# Patient Record
Sex: FEMALE | Race: WHITE | NOT HISPANIC OR LATINO | URBAN - METROPOLITAN AREA
[De-identification: names, ages, dates, MRNs, and addresses within clinical notes are randomized per-mention and may not be internally consistent; named-entity substitution may affect disease eponyms.]

---

## 2023-08-17 PROBLEM — M54.50 LOWER BACK PAIN: Status: ACTIVE | Noted: 2023-08-17

## 2023-08-17 PROBLEM — S93.401A SPRAIN OF RIGHT ANKLE: Status: ACTIVE | Noted: 2023-08-17

## 2023-08-17 PROBLEM — M19.90 ARTHRITIS: Status: ACTIVE | Noted: 2023-08-17

## 2023-08-17 PROBLEM — S82.899A ANKLE FRACTURE: Status: ACTIVE | Noted: 2023-08-17

## 2023-08-17 PROBLEM — M06.9 RHEUMATOID ARTHRITIS (MULTI): Status: ACTIVE | Noted: 2023-08-17

## 2023-08-17 PROBLEM — M51.37 DEGENERATION OF INTERVERTEBRAL DISC OF LUMBOSACRAL REGION: Status: ACTIVE | Noted: 2023-08-17

## 2023-08-17 PROBLEM — I10 ESSENTIAL HYPERTENSION: Status: ACTIVE | Noted: 2023-08-17

## 2023-08-17 PROBLEM — R00.0 TACHYCARDIA: Status: ACTIVE | Noted: 2023-08-17

## 2023-08-17 PROBLEM — G57.13 MERALGIA PARESTHETICA OF BOTH LOWER EXTREMITIES: Status: ACTIVE | Noted: 2023-08-17

## 2023-08-17 PROBLEM — M25.571 PAIN IN RIGHT ANKLE AND JOINTS OF RIGHT FOOT: Status: ACTIVE | Noted: 2023-08-17

## 2023-08-17 PROBLEM — M54.42 BILATERAL LOW BACK PAIN WITH LEFT-SIDED SCIATICA: Status: ACTIVE | Noted: 2023-08-17

## 2023-08-17 PROBLEM — G89.29 OTHER CHRONIC PAIN: Status: ACTIVE | Noted: 2023-08-17

## 2023-08-17 PROBLEM — F41.9 ANXIETY: Status: ACTIVE | Noted: 2023-08-17

## 2023-08-17 PROBLEM — E11.40 PAINFUL DIABETIC NEUROPATHY (MULTI): Status: ACTIVE | Noted: 2023-08-17

## 2023-08-17 PROBLEM — G47.9 SLEEP DISORDER: Status: ACTIVE | Noted: 2023-08-17

## 2023-08-17 PROBLEM — M51.379 DEGENERATION OF INTERVERTEBRAL DISC OF LUMBOSACRAL REGION: Status: ACTIVE | Noted: 2023-08-17

## 2023-08-17 PROBLEM — M25.371 RIGHT ANKLE INSTABILITY: Status: ACTIVE | Noted: 2023-08-17

## 2023-08-17 PROBLEM — D05.02 LOBULAR CARCINOMA IN SITU (LCIS) OF LEFT BREAST: Status: ACTIVE | Noted: 2023-08-17

## 2023-08-17 PROBLEM — M81.0 OSTEOPOROSIS: Status: ACTIVE | Noted: 2023-08-17

## 2023-08-17 PROBLEM — M54.16 LEFT LUMBAR RADICULITIS: Status: ACTIVE | Noted: 2023-08-17

## 2023-08-17 PROBLEM — Z85.3 HISTORY OF LEFT BREAST CANCER: Status: ACTIVE | Noted: 2023-08-17

## 2023-08-17 PROBLEM — J30.2 SEASONAL ALLERGIES: Status: ACTIVE | Noted: 2023-08-17

## 2023-08-17 PROBLEM — G47.00 INSOMNIA: Status: ACTIVE | Noted: 2023-08-17

## 2023-08-17 PROBLEM — R60.0 EDEMA OF BOTH LEGS: Status: ACTIVE | Noted: 2023-08-17

## 2023-08-17 PROBLEM — K59.01 CONSTIPATION BY DELAYED COLONIC TRANSIT: Status: ACTIVE | Noted: 2023-08-17

## 2023-08-17 PROBLEM — M25.559 PAIN IN JOINT INVOLVING PELVIC REGION AND THIGH: Status: ACTIVE | Noted: 2023-08-17

## 2023-08-17 PROBLEM — M81.0 POST-MENOPAUSAL OSTEOPOROSIS: Status: ACTIVE | Noted: 2023-08-17

## 2023-08-17 PROBLEM — C50.912 MALIGNANT NEOPLASM OF LEFT FEMALE BREAST (MULTI): Status: ACTIVE | Noted: 2023-08-17

## 2023-08-17 PROBLEM — E55.9 VITAMIN D DEFICIENCY: Status: ACTIVE | Noted: 2023-08-17

## 2023-08-17 PROBLEM — R42 DIZZINESS AND GIDDINESS: Status: ACTIVE | Noted: 2023-08-17

## 2023-08-17 PROBLEM — K21.9 GERD WITHOUT ESOPHAGITIS: Status: ACTIVE | Noted: 2023-08-17

## 2023-08-17 RX ORDER — CLONAZEPAM 1 MG/1
1 TABLET ORAL DAILY
COMMUNITY
Start: 2023-04-26 | End: 2023-10-26

## 2023-08-17 RX ORDER — ANASTROZOLE 1 MG/1
1 TABLET ORAL
COMMUNITY
End: 2023-11-21 | Stop reason: ALTCHOICE

## 2023-08-17 RX ORDER — TRIAMTERENE/HYDROCHLOROTHIAZID 37.5-25 MG
1 TABLET ORAL DAILY
COMMUNITY
End: 2023-12-27

## 2023-08-17 RX ORDER — TRAMADOL HYDROCHLORIDE AND ACETAMINOPHEN 37.5; 325 MG/1; MG/1
1 TABLET, FILM COATED ORAL EVERY 4 HOURS PRN
COMMUNITY
Start: 2016-12-02 | End: 2023-11-21 | Stop reason: ALTCHOICE

## 2023-08-17 RX ORDER — OMEPRAZOLE 20 MG/1
20 CAPSULE, DELAYED RELEASE ORAL DAILY
COMMUNITY
End: 2023-12-28

## 2023-08-17 RX ORDER — DOCUSATE SODIUM 100 MG/1
100 CAPSULE, LIQUID FILLED ORAL 2 TIMES DAILY
COMMUNITY

## 2023-08-17 RX ORDER — DULOXETIN HYDROCHLORIDE 60 MG/1
60 CAPSULE, DELAYED RELEASE ORAL DAILY
COMMUNITY
Start: 2022-05-23 | End: 2024-01-08 | Stop reason: SDUPTHER

## 2023-08-17 RX ORDER — SULFASALAZINE 500 MG/1
500 TABLET ORAL EVERY 12 HOURS
COMMUNITY
End: 2024-01-18 | Stop reason: SDUPTHER

## 2023-08-17 RX ORDER — LACTULOSE 10 G/15ML
SOLUTION ORAL
COMMUNITY

## 2023-08-17 RX ORDER — TIZANIDINE 4 MG/1
4 TABLET ORAL
COMMUNITY
Start: 2017-11-10

## 2023-08-17 RX ORDER — TOCILIZUMAB 180 MG/ML
162 INJECTION, SOLUTION SUBCUTANEOUS
COMMUNITY
End: 2023-11-21 | Stop reason: ALTCHOICE

## 2023-08-18 RX ORDER — CELECOXIB 200 MG/1
200 CAPSULE ORAL
COMMUNITY
Start: 2023-07-25 | End: 2024-01-08 | Stop reason: SDUPTHER

## 2023-08-18 RX ORDER — TOCILIZUMAB 180 MG/ML
162 INJECTION, SOLUTION SUBCUTANEOUS
COMMUNITY
Start: 2023-05-14 | End: 2023-11-21 | Stop reason: ALTCHOICE

## 2023-08-18 RX ORDER — UPADACITINIB 15 MG/1
15 TABLET, EXTENDED RELEASE ORAL DAILY
COMMUNITY
Start: 2023-02-08 | End: 2023-11-21 | Stop reason: ALTCHOICE

## 2023-08-18 RX ORDER — BETAMETHASONE DIPROPIONATE 0.5 MG/G
OINTMENT TOPICAL DAILY
COMMUNITY
Start: 2023-01-27

## 2023-08-18 RX ORDER — ABATACEPT 125 MG/ML
125 INJECTION, SOLUTION SUBCUTANEOUS
COMMUNITY
Start: 2022-11-22 | End: 2023-11-21 | Stop reason: ALTCHOICE

## 2023-08-18 RX ORDER — METFORMIN HYDROCHLORIDE 750 MG/1
750 TABLET, EXTENDED RELEASE ORAL
COMMUNITY
Start: 2023-07-06 | End: 2023-10-02 | Stop reason: ALTCHOICE

## 2023-08-18 RX ORDER — TRAZODONE HYDROCHLORIDE 50 MG/1
50 TABLET ORAL NIGHTLY
COMMUNITY
Start: 2023-07-25 | End: 2023-10-02 | Stop reason: SDUPTHER

## 2023-08-18 RX ORDER — BARICITINIB 2 MG/1
2 TABLET, FILM COATED ORAL DAILY
COMMUNITY
Start: 2023-07-13 | End: 2023-11-21 | Stop reason: ALTCHOICE

## 2023-08-18 RX ORDER — TOPIRAMATE 25 MG/1
25 TABLET ORAL
COMMUNITY
Start: 2023-07-25 | End: 2023-11-21 | Stop reason: ALTCHOICE

## 2023-08-18 RX ORDER — AMOXICILLIN 500 MG/1
500 CAPSULE ORAL
COMMUNITY
Start: 2023-03-06 | End: 2023-11-21 | Stop reason: ALTCHOICE

## 2023-09-07 ENCOUNTER — HOSPITAL ENCOUNTER (OUTPATIENT)
Dept: DATA CONVERSION | Facility: HOSPITAL | Age: 63
Discharge: HOME | End: 2023-09-07
Payer: COMMERCIAL

## 2023-09-07 DIAGNOSIS — D47.2 MONOCLONAL GAMMOPATHY: ICD-10-CM

## 2023-09-07 DIAGNOSIS — K75.9 INFLAMMATORY LIVER DISEASE, UNSPECIFIED: ICD-10-CM

## 2023-09-07 DIAGNOSIS — D84.1 DEFECTS IN THE COMPLEMENT SYSTEM (MULTI): ICD-10-CM

## 2023-09-07 DIAGNOSIS — M06.4 INFLAMMATORY POLYARTHROPATHY (MULTI): ICD-10-CM

## 2023-09-07 DIAGNOSIS — M35.9 SYSTEMIC INVOLVEMENT OF CONNECTIVE TISSUE, UNSPECIFIED (MULTI): ICD-10-CM

## 2023-09-07 DIAGNOSIS — M45.9 ANKYLOSING SPONDYLITIS OF UNSPECIFIED SITES IN SPINE (MULTI): ICD-10-CM

## 2023-09-07 LAB
BACTERIA UR QL AUTO: POSITIVE
BASOPHILS # BLD AUTO: 0.03 K/UL (ref 0–0.22)
BASOPHILS NFR BLD AUTO: 0.6 % (ref 0–1)
BILIRUB UR QL STRIP.AUTO: NEGATIVE
C3 SERPL-MCNC: 129 MG/DL (ref 68–260)
C4 SERPL-MCNC: 19 MG/DL (ref 16–64)
CLARITY UR: CLEAR
COLOR UR: YELLOW
CRP SERPL-MCNC: 0.5 MG/DL (ref 0–2)
DEPRECATED RDW RBC AUTO: 49.5 FL (ref 37–54)
DIFFERENTIAL METHOD BLD: ABNORMAL
EOSINOPHIL # BLD AUTO: 0.09 K/UL (ref 0–0.45)
EOSINOPHIL NFR BLD: 1.8 % (ref 0–3)
ERYTHROCYTE [DISTWIDTH] IN BLOOD BY AUTOMATED COUNT: 13.2 % (ref 11.7–15)
ERYTHROCYTE [SEDIMENTATION RATE] IN BLOOD BY WESTERGREN METHOD: 10 MM/HR (ref 0–30)
GLUCOSE UR STRIP.AUTO-MCNC: NEGATIVE MG/DL
HCT VFR BLD AUTO: 36.6 % (ref 36–44)
HGB BLD-MCNC: 11.8 GM/DL (ref 12–15)
HGB UR QL STRIP.AUTO: 1 /HPF (ref 0–3)
HGB UR QL: NEGATIVE
HYALINE CASTS UR QL AUTO: ABNORMAL /LPF
IMM GRANULOCYTES # BLD AUTO: 0.04 K/UL (ref 0–0.1)
KETONES UR QL STRIP.AUTO: NEGATIVE
LEUKOCYTE ESTERASE UR QL STRIP.AUTO: NEGATIVE
LYMPHOCYTES # BLD AUTO: 1.51 K/UL (ref 1.2–3.2)
LYMPHOCYTES NFR BLD MANUAL: 30.4 % (ref 20–40)
MCH RBC QN AUTO: 32.7 PG (ref 26–34)
MCHC RBC AUTO-ENTMCNC: 32.2 % (ref 31–37)
MCV RBC AUTO: 101.4 FL (ref 80–100)
MICROSCOPIC (UA): ABNORMAL
MONOCYTES # BLD AUTO: 0.36 K/UL (ref 0–0.8)
MONOCYTES NFR BLD MANUAL: 7.3 % (ref 0–8)
NEUTROPHILS # BLD AUTO: 2.93 K/UL
NEUTROPHILS # BLD AUTO: 2.93 K/UL (ref 1.8–7.7)
NEUTROPHILS.IMMATURE NFR BLD: 0.8 % (ref 0–1)
NEUTS SEG NFR BLD: 59.1 % (ref 50–70)
NITRITE UR QL STRIP.AUTO: NEGATIVE
NRBC BLD-RTO: 0 /100 WBC
PH UR STRIP.AUTO: 6 [PH] (ref 4.6–8)
PLATELET # BLD AUTO: 426 K/UL (ref 150–450)
PMV BLD AUTO: 9.8 CU (ref 7–12.6)
PROT UR STRIP.AUTO-MCNC: NEGATIVE MG/DL
RBC # BLD AUTO: 3.61 M/UL (ref 4–4.9)
RHEUMATOID FACT SERPL-ACNC: 10 IU/ML (ref 0–20)
SP GR UR STRIP.AUTO: 1.01 (ref 1–1.03)
SQUAMOUS UR QL AUTO: ABNORMAL /HPF
URINE CULTURE: ABNORMAL
UROBILINOGEN UR QL STRIP.AUTO: NORMAL MG/DL (ref 0–1)
WBC # BLD AUTO: 5 K/UL (ref 4.5–11)
WBC #/AREA URNS AUTO: 37 /HPF (ref 0–3)

## 2023-09-08 LAB
ALBUMIN SERPL ELPH-MCNC: NORMAL G/DL
ALPHA1 GLOB SERPL ELPH-MCNC: NORMAL G/DL
ALPHA2 GLOB SERPL ELPH-MCNC: NORMAL G/DL
ANA PATTERN: ABNORMAL
ANA SER QL IA: ABNORMAL
ANA TITER: ABNORMAL
B-GLOBULIN SERPL ELPH-MCNC: NORMAL G/DL
CCP IGG SERPL-ACNC: NORMAL
ENA RNP AB SER-ACNC: NORMAL
ENA SS-A AB SER-ACNC: NORMAL
ENA SS-B AB SER-ACNC: NORMAL
GAMMA GLOB SERPL ELPH-MCNC: NORMAL G/DL
HAV IGM SER QL: NORMAL
HBV CORE IGM SER QL: NORMAL
HBV SURFACE AG SER QL: NORMAL
HCV AB SER QL: NORMAL
PROT PATTERN SERPL ELPH-IMP: NORMAL
PROT SERPL-MCNC: NORMAL G/DL

## 2023-09-11 LAB
IL6 SERPL-MCNC: ABNORMAL PG/ML
SPE STAFF REVIEW: NORMAL

## 2023-09-12 LAB — HLA-B27 DNA RESULT: NORMAL

## 2023-09-14 LAB
CEP-1 AB SER IA-ACNC: NORMAL
MUT CIT VIMENTIN AB SER-ACNC: ABNORMAL

## 2023-09-18 LAB — C2 SERPL-MCNC: NORMAL MG/L

## 2023-09-19 LAB — C1Q SERPL-MCNC: NORMAL UG/ML

## 2023-09-25 PROBLEM — D47.2 ASYMPTOMATIC MONOCLONAL GAMMOPATHY: Status: ACTIVE | Noted: 2023-09-25

## 2023-09-25 PROBLEM — D84.1 DEFECTS IN THE COMPLEMENT SYSTEM (MULTI): Status: ACTIVE | Noted: 2023-09-25

## 2023-09-25 PROBLEM — M06.4 INFLAMMATORY POLYARTHRITIS (MULTI): Status: ACTIVE | Noted: 2023-09-25

## 2023-09-25 PROBLEM — R00.2 PALPITATIONS: Status: ACTIVE | Noted: 2023-09-25

## 2023-09-25 PROBLEM — M45.9 ANKYLOSING SPONDYLITIS OF UNSPECIFIED SITES IN SPINE (MULTI): Status: ACTIVE | Noted: 2023-09-25

## 2023-09-25 PROBLEM — E88.819 INSULIN RESISTANCE: Status: ACTIVE | Noted: 2023-09-25

## 2023-09-25 PROBLEM — M35.9 SYSTEMIC INVOLVEMENT OF CONNECTIVE TISSUE, UNSPECIFIED (MULTI): Status: ACTIVE | Noted: 2023-09-25

## 2023-09-25 PROBLEM — M79.7 FIBROMYALGIA: Status: ACTIVE | Noted: 2023-09-25

## 2023-10-02 ENCOUNTER — OFFICE VISIT (OUTPATIENT)
Dept: PRIMARY CARE | Facility: CLINIC | Age: 63
End: 2023-10-02
Payer: COMMERCIAL

## 2023-10-02 ENCOUNTER — OFFICE VISIT (OUTPATIENT)
Dept: PAIN MEDICINE | Facility: CLINIC | Age: 63
End: 2023-10-02
Payer: COMMERCIAL

## 2023-10-02 VITALS
HEIGHT: 65 IN | DIASTOLIC BLOOD PRESSURE: 68 MMHG | BODY MASS INDEX: 30.12 KG/M2 | HEART RATE: 110 BPM | TEMPERATURE: 97.1 F | WEIGHT: 180.8 LBS | SYSTOLIC BLOOD PRESSURE: 124 MMHG

## 2023-10-02 VITALS
DIASTOLIC BLOOD PRESSURE: 76 MMHG | RESPIRATION RATE: 22 BRPM | BODY MASS INDEX: 26.66 KG/M2 | SYSTOLIC BLOOD PRESSURE: 128 MMHG | HEIGHT: 65 IN | HEART RATE: 83 BPM | WEIGHT: 160 LBS

## 2023-10-02 DIAGNOSIS — I10 ESSENTIAL HYPERTENSION: Primary | ICD-10-CM

## 2023-10-02 DIAGNOSIS — E78.5 DYSLIPIDEMIA: ICD-10-CM

## 2023-10-02 DIAGNOSIS — K59.01 CONSTIPATION BY DELAYED COLONIC TRANSIT: ICD-10-CM

## 2023-10-02 DIAGNOSIS — M79.7 FIBROMYALGIA: ICD-10-CM

## 2023-10-02 DIAGNOSIS — E88.819 INSULIN RESISTANCE: ICD-10-CM

## 2023-10-02 DIAGNOSIS — F41.9 ANXIETY: ICD-10-CM

## 2023-10-02 DIAGNOSIS — M54.16 LUMBAR RADICULOPATHY: Primary | ICD-10-CM

## 2023-10-02 DIAGNOSIS — F51.01 PRIMARY INSOMNIA: ICD-10-CM

## 2023-10-02 DIAGNOSIS — M54.16 LUMBAR RADICULOPATHY: ICD-10-CM

## 2023-10-02 PROCEDURE — 99214 OFFICE O/P EST MOD 30 MIN: CPT | Performed by: ANESTHESIOLOGY

## 2023-10-02 PROCEDURE — 3074F SYST BP LT 130 MM HG: CPT | Performed by: ANESTHESIOLOGY

## 2023-10-02 PROCEDURE — 99214 OFFICE O/P EST MOD 30 MIN: CPT | Performed by: INTERNAL MEDICINE

## 2023-10-02 PROCEDURE — 3074F SYST BP LT 130 MM HG: CPT | Performed by: INTERNAL MEDICINE

## 2023-10-02 PROCEDURE — 3078F DIAST BP <80 MM HG: CPT | Performed by: INTERNAL MEDICINE

## 2023-10-02 PROCEDURE — 3078F DIAST BP <80 MM HG: CPT | Performed by: ANESTHESIOLOGY

## 2023-10-02 RX ORDER — TRAMADOL HYDROCHLORIDE 50 MG/1
50 TABLET ORAL EVERY 6 HOURS PRN
Qty: 40 TABLET | Refills: 1 | Status: SHIPPED | OUTPATIENT
Start: 2023-10-02 | End: 2023-10-30

## 2023-10-02 RX ORDER — TRAZODONE HYDROCHLORIDE 50 MG/1
50 TABLET ORAL NIGHTLY
Qty: 90 TABLET | Refills: 1 | Status: SHIPPED | OUTPATIENT
Start: 2023-10-02 | End: 2023-12-28

## 2023-10-02 RX ORDER — PREGABALIN 75 MG/1
75 CAPSULE ORAL 2 TIMES DAILY
Qty: 60 CAPSULE | Refills: 0 | Status: SHIPPED | OUTPATIENT
Start: 2023-10-02 | End: 2023-12-13 | Stop reason: WASHOUT

## 2023-10-02 ASSESSMENT — ENCOUNTER SYMPTOMS
NERVOUS/ANXIOUS: 1
WHEEZING: 0
COUGH: 0
UNEXPECTED WEIGHT CHANGE: 0
WEAKNESS: 0
SORE THROAT: 0
CONFUSION: 0
FATIGUE: 1
SHORTNESS OF BREATH: 0
ADENOPATHY: 0
NUMBNESS: 1
PALPITATIONS: 0
DIZZINESS: 0
ABDOMINAL DISTENTION: 0
BACK PAIN: 1
NECK PAIN: 1
CONSTIPATION: 1
BLOOD IN STOOL: 0
SINUS PAIN: 0
DYSURIA: 0
NECK STIFFNESS: 1

## 2023-10-02 ASSESSMENT — PAIN SCALES - GENERAL
PAINLEVEL: 8
PAINLEVEL_OUTOF10: 8

## 2023-10-02 ASSESSMENT — PAIN - FUNCTIONAL ASSESSMENT: PAIN_FUNCTIONAL_ASSESSMENT: 0-10

## 2023-10-02 ASSESSMENT — PATIENT HEALTH QUESTIONNAIRE - PHQ9
2. FEELING DOWN, DEPRESSED OR HOPELESS: NOT AT ALL
SUM OF ALL RESPONSES TO PHQ9 QUESTIONS 1 & 2: 0
1. LITTLE INTEREST OR PLEASURE IN DOING THINGS: NOT AT ALL

## 2023-10-02 ASSESSMENT — PAIN DESCRIPTION - DESCRIPTORS: DESCRIPTORS: ACHING;THROBBING;SHARP;SHOOTING

## 2023-10-02 ASSESSMENT — LIFESTYLE VARIABLES
HOW OFTEN DO YOU HAVE SIX OR MORE DRINKS ON ONE OCCASION: NEVER
HOW OFTEN DO YOU HAVE A DRINK CONTAINING ALCOHOL: NEVER

## 2023-10-02 NOTE — PROGRESS NOTES
Patient here for low back pain.  Reports 40% improvement from last injection, but it is wearing off.  Would like to discuss other options.

## 2023-10-02 NOTE — PROGRESS NOTES
Subjective   Patient ID: Soni Ochoa is a 63 y.o. female who presents for Annual Exam.    Saw Dr Hughes in September 2023, she had labs. Was told that she has Fibromyalgia. Saw pain management today, plan to start Gabapenti/Lyrica. Was advised to get Lumbar spine MRI    HPI     H/O Insomnia- stated that trazodone did not help. She was on clonazepam for many years which helped her with sleep and anxiety. Taking Clonazepam, at night helps. Stated that she stopped Arimidex, and her oncologist is aware. Complaining of abdominal bloating since last few years. Has seen Gastroenterologist and work up was negative. History of chronic constipation-. Taking Colace, senna and MiraLax with no relief .    H/O Chronic low back pain- seeing Dr Santiago. H/O RA- Was started on Enbrel, she was on Humira before.     H/O Osteoporosis- Last bone density scan was in 2020, stated Prolia caused stomach upset.     History of vitamin-D deficiency-unable to tolerate vitamin-D tablets, advised to use droplets over-the-counter.     Concerned about weight gain, wants to lose weight. Her weight loss goal is 40 pounds. Stated she lost weight with weight watchers previously. Her activity is limited to back pain.     Discussed weight loss medication options, due to constipation,  In the interim started on metformin,  she stopped it after few weeks as it did not help with weight loss    Review of Systems   Constitutional:  Positive for fatigue. Negative for unexpected weight change.   HENT:  Negative for congestion, ear pain, sinus pain and sore throat.    Respiratory:  Negative for cough, shortness of breath and wheezing.    Cardiovascular:  Negative for chest pain, palpitations and leg swelling.   Gastrointestinal:  Positive for constipation. Negative for abdominal distention and blood in stool.   Genitourinary:  Negative for dysuria, pelvic pain and vaginal bleeding.   Musculoskeletal:  Positive for back pain, neck pain and neck stiffness.  "  Neurological:  Positive for numbness. Negative for dizziness, syncope and weakness.   Hematological:  Negative for adenopathy.   Psychiatric/Behavioral:  Negative for behavioral problems, confusion and suicidal ideas. The patient is nervous/anxious.        Objective   /68 (BP Location: Right arm, Patient Position: Sitting, BP Cuff Size: Large adult)   Pulse 110   Temp 36.2 °C (97.1 °F)   Ht 1.651 m (5' 5\")   Wt 82 kg (180 lb 12.8 oz)   BMI 30.09 kg/m²     Physical Exam  Constitutional:       General: She is not in acute distress.  HENT:      Head: Normocephalic and atraumatic.   Eyes:      Extraocular Movements: Extraocular movements intact.      Pupils: Pupils are equal, round, and reactive to light.   Cardiovascular:      Rate and Rhythm: Normal rate and regular rhythm.   Pulmonary:      Effort: Pulmonary effort is normal.      Breath sounds: Normal breath sounds. No wheezing or rales.   Abdominal:      General: Bowel sounds are normal.      Palpations: Abdomen is soft. There is no mass.      Tenderness: There is no guarding.      Hernia: No hernia is present.   Skin:     General: Skin is warm and dry.   Neurological:      General: No focal deficit present.      Mental Status: She is alert.      Cranial Nerves: No cranial nerve deficit.   Psychiatric:         Mood and Affect: Mood normal.         Assessment/Plan       Soni was seen today for annual exam.  Diagnoses and all orders for this visit:  Essential hypertension (Primary)  Insulin resistance  -     Hemoglobin A1c; Future  Anxiety  -     TSH with reflex to Free T4 if abnormal; Future  Primary insomnia  -     traZODone (Desyrel) 50 mg tablet; Take 1 tablet (50 mg) by mouth once daily at bedtime. take 3 tablets by mouth nightly  Fibromyalgia  Dyslipidemia  -     Lipid Panel; Future  Constipation by delayed colonic transit  Lumbar radiculopathy       Current Outpatient Medications   Medication Instructions    Actemra ACTPen 162 mg, subcutaneous    " Actemra 162 mg, subcutaneous    amoxicillin (AMOXIL) 500 mg, oral, take 1 capsule by mouth 3 times daily for 5 days    anastrozole (ARIMIDEX) 1 mg, oral    betamethasone dipropionate (Diprolene) 0.05 % ointment Topical, Daily, apply 1 application externally once daily<BR>    celecoxib (CELEBREX) 200 mg, oral, 2 times daily with meals    clonazePAM (KLONOPIN) 1 mg, oral, Daily    docusate sodium (COLACE) 100 mg, oral, 2 times daily    DULoxetine (CYMBALTA) 60 mg, oral, Daily    lactulose (Constulose) 20 gram/30 mL oral solution Take 3 TSP orally every 12 hours as needed    Olumiant 2 mg, oral, Daily    omeprazole (PriLOSEC) 10 mg packet for oral suspension oral    omeprazole (PRILOSEC) 20 mg, oral, Daily    Orencia 125 mg, subcutaneous    pregabalin (LYRICA) 75 mg, oral, 2 times daily    Rinvoq 15 mg, oral, Daily    sulfaSALAzine (AZULFIDINE) 500 mg, oral, Every 12 hours    tiZANidine (ZANAFLEX) 4 mg, oral    topiramate (TOPAMAX) 25 mg, oral, take 1 tablet by mouth once daily for 2 weeks, then take 2 tablets from week 3 and on    traMADol (ULTRAM) 50 mg, oral, Every 6 hours PRN    traMADoL-acetaminophen (UltraCET) 37.5-325 mg tablet 1 tablet, oral, Every 4 hours PRN    traZODone (DESYREL) 50 mg, oral, Nightly, take 3 tablets by mouth nightly    triamterene-hydrochlorothiazid (Maxzide-25) 37.5-25 mg tablet 1 tablet, Daily

## 2023-10-02 NOTE — PROGRESS NOTES
The patient is a 63-year-old female with low back and left leg pain.  The pain radiates down the posterior lateral aspect of the left leg to the ankle.  The pain has been worsening.  The patient is now experiencing numbness and weakness.  The patient has concerned about the numbness and the weakness.  The patient the pain is worse with standing and walking.  She gets some relief with rest.  She benefits from the occasional epidural steroid injection.  She has participated in formal physical therapy programs.  She continues her physician directed home exercise program daily.  She takes tramadol very sparingly.  She would like to try gabapentin or pregabalin for fibromyalgia.    GENERAL: alert and appropriate, in no distress, well-hydrated, well-nourished and interactive  SKIN: no rash noted, surgical scars well healed  RESPIRATORY: breathing non-labored and no grunting/flaring/retractions  CHEST: equal chest rise with normal respiratory effort  ABDOMEN: soft and non-tender  BACK: back normal in appearance, spine with reduced ROM  EXTREMITIES:  dorsiflexor weakness on the samy  NEUROLOGIC: gait antalgic, SLR positive, decreased sensation distally in the L5 distribution on the left      Assessment and plan:    -Given her pattern of pain and the neurologic deficits, I would like the patient to have of a lumbar spine MRI    -Depending upon the results, I would refer the patient to one of our excellent spine surgeons  -We discussed the importance of physical therapy and exercise.  We discussed avoidance and modification techniques.  -Refill tramadol  -The patient may benefit from pregabalin; we discussed the mechanism of action of this medication as well as the side effect profile     I spent time educating the patient on the condition including the treatment and the prognosis.  I invited the patient to call at anytime with any questions.

## 2023-10-23 ENCOUNTER — TELEPHONE (OUTPATIENT)
Dept: PAIN MEDICINE | Facility: CLINIC | Age: 63
End: 2023-10-23
Payer: COMMERCIAL

## 2023-10-23 NOTE — TELEPHONE ENCOUNTER
Patient left message stating her lyrica was on hold at pharmacy due to needing prior auth through her medical mutual insurance

## 2023-10-25 DIAGNOSIS — F41.9 ANXIETY DISORDER, UNSPECIFIED: ICD-10-CM

## 2023-10-26 RX ORDER — CLONAZEPAM 1 MG/1
1 TABLET ORAL DAILY PRN
Qty: 30 TABLET | Refills: 2 | Status: SHIPPED | OUTPATIENT
Start: 2023-10-26 | End: 2023-12-27

## 2023-10-28 DIAGNOSIS — M54.16 LUMBAR RADICULOPATHY: ICD-10-CM

## 2023-10-30 RX ORDER — TRAMADOL HYDROCHLORIDE 50 MG/1
50 TABLET ORAL EVERY 6 HOURS PRN
Qty: 40 TABLET | Refills: 1 | Status: SHIPPED | OUTPATIENT
Start: 2023-10-30 | End: 2023-11-21

## 2023-11-06 ENCOUNTER — HOSPITAL ENCOUNTER (OUTPATIENT)
Dept: RADIOLOGY | Facility: HOSPITAL | Age: 63
Discharge: HOME | End: 2023-11-06
Payer: COMMERCIAL

## 2023-11-06 DIAGNOSIS — M54.16 LUMBAR RADICULOPATHY: ICD-10-CM

## 2023-11-06 PROCEDURE — 72148 MRI LUMBAR SPINE W/O DYE: CPT

## 2023-11-10 ENCOUNTER — APPOINTMENT (OUTPATIENT)
Dept: PAIN MEDICINE | Facility: CLINIC | Age: 63
End: 2023-11-10
Payer: COMMERCIAL

## 2023-11-14 ENCOUNTER — APPOINTMENT (OUTPATIENT)
Dept: GASTROENTEROLOGY | Facility: HOSPITAL | Age: 63
End: 2023-11-14
Payer: COMMERCIAL

## 2023-11-21 ENCOUNTER — HOSPITAL ENCOUNTER (OUTPATIENT)
Dept: RADIOLOGY | Facility: HOSPITAL | Age: 63
Discharge: HOME | End: 2023-11-21
Payer: COMMERCIAL

## 2023-11-21 ENCOUNTER — HOSPITAL ENCOUNTER (OUTPATIENT)
Dept: OPERATING ROOM | Facility: HOSPITAL | Age: 63
Setting detail: OUTPATIENT SURGERY
Discharge: HOME | End: 2023-11-21
Payer: COMMERCIAL

## 2023-11-21 VITALS
DIASTOLIC BLOOD PRESSURE: 97 MMHG | HEART RATE: 81 BPM | SYSTOLIC BLOOD PRESSURE: 135 MMHG | RESPIRATION RATE: 17 BRPM | OXYGEN SATURATION: 100 % | TEMPERATURE: 97.2 F | WEIGHT: 180 LBS | BODY MASS INDEX: 29.95 KG/M2

## 2023-11-21 DIAGNOSIS — M54.16 LEFT LUMBAR RADICULITIS: Primary | ICD-10-CM

## 2023-11-21 PROCEDURE — 64483 NJX AA&/STRD TFRM EPI L/S 1: CPT | Performed by: ANESTHESIOLOGY

## 2023-11-21 PROCEDURE — 64484 NJX AA&/STRD TFRM EPI L/S EA: CPT | Performed by: ANESTHESIOLOGY

## 2023-11-21 PROCEDURE — 77003 FLUOROGUIDE FOR SPINE INJECT: CPT

## 2023-11-21 ASSESSMENT — PAIN SCALES - GENERAL
PAINLEVEL_OUTOF10: 2
PAINLEVEL_OUTOF10: 10 - WORST POSSIBLE PAIN

## 2023-11-21 ASSESSMENT — PAIN - FUNCTIONAL ASSESSMENT
PAIN_FUNCTIONAL_ASSESSMENT: 0-10
PAIN_FUNCTIONAL_ASSESSMENT: 0-10

## 2023-11-21 ASSESSMENT — PAIN DESCRIPTION - DESCRIPTORS: DESCRIPTORS: STABBING;SHOOTING;SPASM

## 2023-11-21 NOTE — H&P
The patient has a history of low back pain.    ROS:    Negative except pain    GENERAL: alert and appropriate, in no distress, well-hydrated, well-nourished and interactive  SKIN: no rash noted  RESPIRATORY: breathing non-labored and no grunting/flaring/retractions  CHEST: equal chest rise with normal respiratory effort  ABDOMEN: soft and non-tender  BACK: back normal in appearance, spine with reduced ROM  EXTREMITIES: strength intact  NEUROLOGIC: gait antalgic, sensation grossly intact.    Assessment and plan    Left transforaminal epidural steroid injection L4 and L5

## 2023-11-21 NOTE — POST-PROCEDURE NOTE
Patient is alert and oriented, no distress noted.  Bandaid to low back, left side  clean, dry and intact.  Patient able to stand and ambulated without assistance.

## 2023-11-21 NOTE — OP NOTE
* No procedures listed * Operative Note     Date: 2023  OR Location: [facility] End[facility] OR    Name: Soni Ochoa, : 1960, Age: 63 y.o., MRN: [id number], Sex: female    Diagnosis  Pre-op Diagnosis     * Lumbar radiculopathy [M54.16] Post-op Diagnosis     * Lumbar radiculopathy [M54.16]     Procedures  * No procedures documented on diagnosis form *    Surgeons   * No surgeons found in log *    Resident/Fellow/Other Assistant:  * No surgeons found in log *    Procedure Summary  Anesthesia: * No anesthesia type entered *  ASA: ASA status not filed in the log.  Anesthesia Staff: No anesthesia staff entered.  Estimated Blood Loss: 0mL  Intra-op Medications: * Intraprocedure medication information is unavailable because the case start and end events have not been set *      Intraprocedure I/O Totals       None           Specimen: No specimens collected     Staff:   No surgical staff documented.         Drains and/or Catheters: * None in log *    Tourniquet Times:         Implants:     Findings: 0    Indications: Soni Ochoa is an 63 y.o. female who is having surgery for * No pre-op diagnosis entered *.     The patient was seen in the preoperative area. The risks, benefits, complications, treatment options, non-operative alternatives, expected recovery and outcomes were discussed with the patient. The possibilities of reaction to medication, pulmonary aspiration, injury to surrounding structures, bleeding, recurrent infection, the need for additional procedures, failure to diagnose a condition, and creating a complication requiring transfusion or operation were discussed with the patient. The patient concurred with the proposed plan, giving informed consent.  The site of surgery was properly noted/marked if necessary per policy. The patient has been actively warmed in preoperative area. Preoperative antibiotics are not indicated. Venous thrombosis prophylaxis are not indicated.    Procedure Details:  The patient was brought to the procedure room and placed in prone position.  Sterile prep and drape with ChloraPrep and a fenestrated drape.  10 mL of half percent lidocaine were injected through a 25-gauge spinal needle for local anesthesia.  22-gauge pencil point needles were guided to the L4 and L5 foramen on the left under fluoroscopic guidance.  Contrast was injected under live fluoroscopy to ensure proper needle placement.  Then 60 mg of triamcinolone and 2 mL of half percent lidocaine were injected through the needles in divided doses.  The needles were removed and the patient was transferred to recovery.   Complications:  None; patient tolerated the procedure well.    Disposition: PACU - hemodynamically stable.  Condition: stable         Additional Details:     Attending Attestation: I performed the procedure.    *No primary surgeon found*

## 2023-11-21 NOTE — DISCHARGE INSTRUCTIONS
You underwent a procedure today    After most procedures, it is recommended that you relax and limit your activity for the remainder of the day unless you have been told otherwise by your pain physician.  You should not drive a car, operate machinery, or make important legal decisions unless otherwise directed by your pain physician.  You may resume your normal activity, including exercise, tomorrow.      Keep a written pain diary of how much pain relief you experienced following the procedure and the length of time of pain relief you experienced pain relief. Following diagnostic injections like medial branch nerve blocks, sacroiliac joint blocks, stellate ganglion injections and other blocks, it is very important you record the specific amount of pain relief you experienced immediately after the injectionand how long it lasted. Your doctor will ask you for this information at your follow up visit.     For all injections, please keep the injection site dry and inspect the site for a couple of days. You may remove the Band-Aid the day of the injection at any time.     Some discomfort, bruising or slight swelling may occur at the injection site. This is not abnormal if it occurs.  If needed you may:    -Take over the counter medication such as Tylenol or Motrin.   -Apply an ice pack for 30 minutes, 2 to 3 times a day for the first 24 hours.     You may shower today; no soaking baths, hot tubs, whirlpools or swimming pools for two days.      If you are given steroids in your injection, it may take 3-5 days for the steroid medication to take effect. You may notice a worsening of your symptoms for 1-2 days after the injection. This is not abnormal.  You may use acetaminophen, ibuprofen, or prescription medication that your doctor may have prescribed for you if you need to do so.     A few common side effects of steroids include facial flushing, sweating, restlessness, irritability,difficulty sleeping, increase in blood  sugar, and increased blood pressure. If you have diabetes, please monitor your blood sugar at least once a day for at least 5 days. If you have poorly controlled high blood pressure, monitoryour blood pressure for at least 2 days and contact your primary care physician if these numbers are unusually high for you.      If you take aspirin or non-steroidal anti-inflammatory drugs (examples are Motrin, Advil, ibuprofen, Naprosyn, Voltaren, Relafen, etc.) you may restart these this evening.    You do not need to discontinue non-aspirin-containing pain medications prior to an injection (examples: Celebrex, tramadol, hydrocodone and acetaminophen).      If you take a blood thinning medication (Coumadin, Lovenox, Fragmin,Ticlid, Plavix, Pradaxa, etc.), please discuss this with your primary care physician/cardiologist and your pain physician. These medications MUST be discontinued before you can have an injection safely, without the risk of uncontrolled bleeding. If these medications are not discontinued for an appropriate period of time, you will not be able to receivean injection.      If you are taking Coumadin, please have your INR checked the morning of your procedure and bringthe result to your appointment unless otherwise instructed. If your INR is over 1.2, your injection may need to be rescheduled to avoid uncontrolled bleeding from the needle placement.     Call [facility] at [phone] between 8am-4pm Monday - Friday if you are experiencing the following:    If you received an epidural or spinal injection:    -Headache that doesnot go away with medicine, is worse when sitting or standing up, and is greatly relieved upon lying down.   -Severe pain worse than or different than your baseline pain.   -Chills or fever (101º F or greater).   -Drainage or signs of infection at the injection site     Go directly to the Emergency Department if you are experiencing the following and received an  epidural or spinal injection:   -Abrupt weakness or progressive weakness in your legs that starts after you leave the clinic.   -Abrupt severe or worsening numbness in your legs.   -Inability to urinate after the injection or loss of bowel or bladder control without the urge to defecate or urinate.     If you have a clinical question that cannot wait until your next appointment, please call [phone] between 8am-4pm Monday - Friday or send a [facility] message. We do our best to return all non-emergency messages within 24 hours, Monday - Friday. A nurse or physician will return your message.      If you need to cancel an appointment, please call the scheduling staff at [phone] during normal business hours or leave a message at least 24 hours in advance.     If you are going to be sedated for your next procedure, you MUST have responsible adult who can legally drive accompany you home. You cannot eat or drink for eight hours prior to the planned procedure if you are going to receive sedation. You may take your non-blood thinning medications with a small sip of water.

## 2023-12-11 ENCOUNTER — TELEPHONE (OUTPATIENT)
Dept: OBSTETRICS AND GYNECOLOGY | Facility: CLINIC | Age: 63
End: 2023-12-11
Payer: COMMERCIAL

## 2023-12-12 NOTE — TELEPHONE ENCOUNTER
Spoke with patient, made appt for tomorrow morning. She has itching with urination but no other Sx. Advised best to make appt so Dr. Webb can check her urine and do exam

## 2023-12-13 ENCOUNTER — OFFICE VISIT (OUTPATIENT)
Dept: OBSTETRICS AND GYNECOLOGY | Facility: CLINIC | Age: 63
End: 2023-12-13
Payer: COMMERCIAL

## 2023-12-13 VITALS
HEIGHT: 65 IN | WEIGHT: 180.6 LBS | BODY MASS INDEX: 30.09 KG/M2 | SYSTOLIC BLOOD PRESSURE: 133 MMHG | DIASTOLIC BLOOD PRESSURE: 82 MMHG

## 2023-12-13 DIAGNOSIS — L90.0 LICHEN SCLEROSUS: Primary | ICD-10-CM

## 2023-12-13 PROCEDURE — 3075F SYST BP GE 130 - 139MM HG: CPT | Performed by: OBSTETRICS & GYNECOLOGY

## 2023-12-13 PROCEDURE — 3079F DIAST BP 80-89 MM HG: CPT | Performed by: OBSTETRICS & GYNECOLOGY

## 2023-12-13 PROCEDURE — 99213 OFFICE O/P EST LOW 20 MIN: CPT | Performed by: OBSTETRICS & GYNECOLOGY

## 2023-12-13 RX ORDER — BETAMETHASONE DIPROPIONATE 0.5 MG/G
OINTMENT, AUGMENTED TOPICAL 2 TIMES DAILY
Qty: 30 G | Refills: 3 | Status: SHIPPED | OUTPATIENT
Start: 2023-12-13

## 2023-12-13 ASSESSMENT — LIFESTYLE VARIABLES
HOW OFTEN DO YOU HAVE SIX OR MORE DRINKS ON ONE OCCASION: NEVER
HOW MANY STANDARD DRINKS CONTAINING ALCOHOL DO YOU HAVE ON A TYPICAL DAY: PATIENT DOES NOT DRINK
HOW OFTEN DO YOU HAVE A DRINK CONTAINING ALCOHOL: NEVER
SKIP TO QUESTIONS 9-10: 1
AUDIT-C TOTAL SCORE: 0

## 2023-12-13 ASSESSMENT — ENCOUNTER SYMPTOMS
LOSS OF SENSATION IN FEET: 1
DEPRESSION: 0
OCCASIONAL FEELINGS OF UNSTEADINESS: 1

## 2023-12-13 ASSESSMENT — PATIENT HEALTH QUESTIONNAIRE - PHQ9
SUM OF ALL RESPONSES TO PHQ9 QUESTIONS 1 & 2: 0
2. FEELING DOWN, DEPRESSED OR HOPELESS: NOT AT ALL
1. LITTLE INTEREST OR PLEASURE IN DOING THINGS: NOT AT ALL

## 2023-12-13 ASSESSMENT — PAIN SCALES - GENERAL: PAINLEVEL: 0-NO PAIN

## 2023-12-13 NOTE — PROGRESS NOTES
Subjective   Soni Ochoa is a 63 y.o. female who present for evaluation of an abnormal vaginal/vulvar itching.  Pt has noted this for years and has been managed with diflucan and steroid ointment.  Pt states she has not been symptomatic for a while.  Recently she has noted external itching and burning and flaring when urine touches the area. Symptoms have been present for a few weeks. Sexually transmitted infection risk: very low risk of STD exposure. Menstrual flow: menopausal.    Objective   Physical Exam  Constitutional:       Appearance: Normal appearance.   HENT:      Head: Normocephalic and atraumatic.   Genitourinary:     Vagina: No vaginal discharge.      Comments: Vulva with small areas of whitish patches between upper labia minora and majora, R>L  Neurological:      Mental Status: She is alert.     Pap done 1/2023, neg/neg     Assessment/Plan   Lichen sclerosis .    Will refill steroid ointment  Pt to call if this is not helping  Pt has appt in March for annual and this can be rechecked  Biopsy discussed if pt has worsening symptoms or if ointment does not help    Kenia Webb MD

## 2023-12-20 ENCOUNTER — APPOINTMENT (OUTPATIENT)
Dept: RHEUMATOLOGY | Facility: CLINIC | Age: 63
End: 2023-12-20
Payer: COMMERCIAL

## 2023-12-26 DIAGNOSIS — F51.01 PRIMARY INSOMNIA: ICD-10-CM

## 2023-12-26 DIAGNOSIS — M54.16 RADICULOPATHY, LUMBAR REGION: ICD-10-CM

## 2023-12-26 DIAGNOSIS — K21.9 GASTRO-ESOPHAGEAL REFLUX DISEASE WITHOUT ESOPHAGITIS: ICD-10-CM

## 2023-12-26 DIAGNOSIS — F41.9 ANXIETY DISORDER, UNSPECIFIED: ICD-10-CM

## 2023-12-26 DIAGNOSIS — I10 ESSENTIAL HYPERTENSION: Primary | ICD-10-CM

## 2023-12-27 RX ORDER — CLONAZEPAM 1 MG/1
1 TABLET ORAL DAILY PRN
Qty: 30 TABLET | Refills: 2 | Status: SHIPPED | OUTPATIENT
Start: 2023-12-27 | End: 2024-01-12 | Stop reason: SDUPTHER

## 2023-12-27 RX ORDER — TRIAMTERENE/HYDROCHLOROTHIAZID 37.5-25 MG
1 TABLET ORAL
Qty: 90 TABLET | Refills: 1 | Status: SHIPPED | OUTPATIENT
Start: 2023-12-27

## 2023-12-28 RX ORDER — OMEPRAZOLE 20 MG/1
20 CAPSULE, DELAYED RELEASE ORAL DAILY
Qty: 90 CAPSULE | Refills: 1 | Status: SHIPPED | OUTPATIENT
Start: 2023-12-28

## 2023-12-28 RX ORDER — TRAZODONE HYDROCHLORIDE 50 MG/1
150 TABLET ORAL NIGHTLY
Qty: 270 TABLET | Refills: 1 | Status: SHIPPED | OUTPATIENT
Start: 2023-12-28

## 2024-01-08 ENCOUNTER — OFFICE VISIT (OUTPATIENT)
Dept: PAIN MEDICINE | Facility: CLINIC | Age: 64
End: 2024-01-08
Payer: COMMERCIAL

## 2024-01-08 VITALS
HEIGHT: 65 IN | SYSTOLIC BLOOD PRESSURE: 143 MMHG | DIASTOLIC BLOOD PRESSURE: 78 MMHG | RESPIRATION RATE: 18 BRPM | BODY MASS INDEX: 27.49 KG/M2 | WEIGHT: 165 LBS | HEART RATE: 79 BPM

## 2024-01-08 DIAGNOSIS — M54.16 LUMBAR RADICULOPATHY: Primary | ICD-10-CM

## 2024-01-08 PROCEDURE — 3078F DIAST BP <80 MM HG: CPT | Performed by: ANESTHESIOLOGY

## 2024-01-08 PROCEDURE — 3077F SYST BP >= 140 MM HG: CPT | Performed by: ANESTHESIOLOGY

## 2024-01-08 PROCEDURE — 99214 OFFICE O/P EST MOD 30 MIN: CPT | Performed by: ANESTHESIOLOGY

## 2024-01-08 RX ORDER — CELECOXIB 200 MG/1
200 CAPSULE ORAL
Qty: 60 CAPSULE | Refills: 2 | Status: SHIPPED | OUTPATIENT
Start: 2024-01-08

## 2024-01-08 RX ORDER — DULOXETIN HYDROCHLORIDE 60 MG/1
60 CAPSULE, DELAYED RELEASE ORAL DAILY
Qty: 30 CAPSULE | Refills: 2 | Status: SHIPPED | OUTPATIENT
Start: 2024-01-08 | End: 2024-04-17

## 2024-01-08 ASSESSMENT — PATIENT HEALTH QUESTIONNAIRE - PHQ9
1. LITTLE INTEREST OR PLEASURE IN DOING THINGS: NOT AT ALL
2. FEELING DOWN, DEPRESSED OR HOPELESS: NOT AT ALL
SUM OF ALL RESPONSES TO PHQ9 QUESTIONS 1 & 2: 0

## 2024-01-08 ASSESSMENT — ENCOUNTER SYMPTOMS
BACK PAIN: 1
ACTIVITY CHANGE: 1

## 2024-01-08 ASSESSMENT — PAIN DESCRIPTION - DESCRIPTORS: DESCRIPTORS: STABBING;SHOOTING;ACHING

## 2024-01-08 ASSESSMENT — PAIN SCALES - GENERAL
PAINLEVEL_OUTOF10: 8
PAINLEVEL: 8

## 2024-01-08 ASSESSMENT — PAIN - FUNCTIONAL ASSESSMENT: PAIN_FUNCTIONAL_ASSESSMENT: 0-10

## 2024-01-08 NOTE — PROGRESS NOTES
The patient is a 63-year-old female with low back and left leg pain.  The pain radiates down the posterior lateral aspect of the left leg to the ankle.  The patient reports that her pain is returning after her left L4 and L5 transforaminal epidural steroid injection last year.  The patient reports at least 75% relief for 3 to 4 months before the pain returns after each injection.  The patient would like to have another injection as soon as possible.  The patient gets some relief with the use of celecoxib and duloxetine.    Review of Systems   Constitutional:  Positive for activity change.   Musculoskeletal:  Positive for back pain and gait problem.   All other systems reviewed and are negative.      GENERAL: alert and appropriate, in no distress, well-hydrated, well-nourished and interactive  SKIN: no rash noted  RESPIRATORY: breathing non-labored and no grunting/flaring/retractions  CHEST: equal chest rise with normal respiratory effort  ABDOMEN: soft and non-tender  BACK: back normal in appearance, spine with reduced ROM  EXTREMITIES: strength intact  NEUROLOGIC: gait antalgic, SLR negative, sensation grossly intact.    Assessment and Plan    -Chronicity--chronic spinal pain    -Diagnostics--we reviewed her lumbar spine MRI    -Pharmacologic--refill celecoxib and duloxetine    -Psychologic--no need for psychologic intervention from my standpoint    -Physical--we discussed the importance of physical therapy and exercise.  We discussed avoidance and modification techniques.    -Intervention--the patient is a candidate for a left L4 and L5 transforaminal epidural steroid injection.  I explained the risks benefits and alternatives of the procedure to the patient.  The patient wishes to proceed.    I spent time educating the patient on the condition including the treatment and the prognosis.  I invited the patient to call at anytime with any questions.

## 2024-01-08 NOTE — PROGRESS NOTES
SPO2 100    MEDICATION NAME: tramadol   STRENGTH: 50mg  LAST FILL DATE: 10/2/2023  DATE LAST TAKEN: 2024  QUANTITY FILLED: 40  QUANTITY REMAININ  COUNT COMPLETED BY: MARIA M FLORES LPN and Espinoza RN

## 2024-01-09 ENCOUNTER — TELEPHONE (OUTPATIENT)
Dept: PAIN MEDICINE | Facility: CLINIC | Age: 64
End: 2024-01-09
Payer: COMMERCIAL

## 2024-01-09 NOTE — TELEPHONE ENCOUNTER
PT LEFT MESSAGE TO RETURN HER CALL.     CALLED PT BACK AND LEFT A MESSAGE TO HAVE HER RETURN OUR CALL.

## 2024-01-12 ENCOUNTER — OFFICE VISIT (OUTPATIENT)
Dept: PRIMARY CARE | Facility: CLINIC | Age: 64
End: 2024-01-12
Payer: COMMERCIAL

## 2024-01-12 VITALS
HEART RATE: 86 BPM | SYSTOLIC BLOOD PRESSURE: 128 MMHG | OXYGEN SATURATION: 97 % | BODY MASS INDEX: 29.16 KG/M2 | DIASTOLIC BLOOD PRESSURE: 78 MMHG | WEIGHT: 175 LBS | HEIGHT: 65 IN | TEMPERATURE: 96.9 F

## 2024-01-12 DIAGNOSIS — E55.9 VITAMIN D DEFICIENCY: ICD-10-CM

## 2024-01-12 DIAGNOSIS — M06.4 INFLAMMATORY POLYARTHRITIS (MULTI): ICD-10-CM

## 2024-01-12 DIAGNOSIS — G47.00 INSOMNIA, UNSPECIFIED TYPE: ICD-10-CM

## 2024-01-12 DIAGNOSIS — K21.9 GERD WITHOUT ESOPHAGITIS: ICD-10-CM

## 2024-01-12 DIAGNOSIS — M51.37 DEGENERATION OF INTERVERTEBRAL DISC OF LUMBOSACRAL REGION: ICD-10-CM

## 2024-01-12 DIAGNOSIS — M81.0 POST-MENOPAUSAL OSTEOPOROSIS: ICD-10-CM

## 2024-01-12 DIAGNOSIS — M79.7 FIBROMYALGIA: ICD-10-CM

## 2024-01-12 DIAGNOSIS — M06.9 RHEUMATOID ARTHRITIS, INVOLVING UNSPECIFIED SITE, UNSPECIFIED WHETHER RHEUMATOID FACTOR PRESENT (MULTI): ICD-10-CM

## 2024-01-12 DIAGNOSIS — I10 ESSENTIAL HYPERTENSION: Primary | ICD-10-CM

## 2024-01-12 DIAGNOSIS — F41.9 ANXIETY DISORDER, UNSPECIFIED: ICD-10-CM

## 2024-01-12 DIAGNOSIS — M35.9 SYSTEMIC INVOLVEMENT OF CONNECTIVE TISSUE, UNSPECIFIED (MULTI): ICD-10-CM

## 2024-01-12 DIAGNOSIS — M45.9 ANKYLOSING SPONDYLITIS OF UNSPECIFIED SITES IN SPINE (MULTI): ICD-10-CM

## 2024-01-12 PROBLEM — R60.0 EDEMA OF BOTH LEGS: Status: RESOLVED | Noted: 2023-08-17 | Resolved: 2024-01-12

## 2024-01-12 PROBLEM — E11.40 PAINFUL DIABETIC NEUROPATHY (MULTI): Status: RESOLVED | Noted: 2023-08-17 | Resolved: 2024-01-12

## 2024-01-12 PROCEDURE — 3074F SYST BP LT 130 MM HG: CPT | Performed by: INTERNAL MEDICINE

## 2024-01-12 PROCEDURE — 3078F DIAST BP <80 MM HG: CPT | Performed by: INTERNAL MEDICINE

## 2024-01-12 PROCEDURE — 4004F PT TOBACCO SCREEN RCVD TLK: CPT | Performed by: INTERNAL MEDICINE

## 2024-01-12 PROCEDURE — 99213 OFFICE O/P EST LOW 20 MIN: CPT | Performed by: INTERNAL MEDICINE

## 2024-01-12 RX ORDER — CLONAZEPAM 1 MG/1
1 TABLET ORAL DAILY PRN
Qty: 30 TABLET | Refills: 2 | Status: SHIPPED | OUTPATIENT
Start: 2024-01-12 | End: 2024-04-15 | Stop reason: SDUPTHER

## 2024-01-12 ASSESSMENT — ENCOUNTER SYMPTOMS
DYSURIA: 0
OCCASIONAL FEELINGS OF UNSTEADINESS: 0
FATIGUE: 1
LOSS OF SENSATION IN FEET: 0
NERVOUS/ANXIOUS: 1
BLOOD IN STOOL: 0
BACK PAIN: 1
ADENOPATHY: 0
CONFUSION: 0
NECK PAIN: 1
DIARRHEA: 0
WHEEZING: 0
SINUS PAIN: 0
ABDOMINAL DISTENTION: 0
UNEXPECTED WEIGHT CHANGE: 0
CONSTIPATION: 1
SHORTNESS OF BREATH: 0
NUMBNESS: 1
WEAKNESS: 0
DIZZINESS: 0
COUGH: 0
NECK STIFFNESS: 1
PALPITATIONS: 0
DEPRESSION: 0
SORE THROAT: 0

## 2024-01-12 ASSESSMENT — COLUMBIA-SUICIDE SEVERITY RATING SCALE - C-SSRS
2. HAVE YOU ACTUALLY HAD ANY THOUGHTS OF KILLING YOURSELF?: NO
6. HAVE YOU EVER DONE ANYTHING, STARTED TO DO ANYTHING, OR PREPARED TO DO ANYTHING TO END YOUR LIFE?: NO
1. IN THE PAST MONTH, HAVE YOU WISHED YOU WERE DEAD OR WISHED YOU COULD GO TO SLEEP AND NOT WAKE UP?: NO

## 2024-01-12 ASSESSMENT — PATIENT HEALTH QUESTIONNAIRE - PHQ9
SUM OF ALL RESPONSES TO PHQ9 QUESTIONS 1 AND 2: 0
2. FEELING DOWN, DEPRESSED OR HOPELESS: NOT AT ALL
1. LITTLE INTEREST OR PLEASURE IN DOING THINGS: NOT AT ALL

## 2024-01-12 ASSESSMENT — PAIN SCALES - GENERAL: PAINLEVEL: 6

## 2024-01-12 NOTE — PROGRESS NOTES
Subjective   Patient ID: Soni Ochoa is a 63 y.o. female who presents for 3 month follow up.    Saw Dr Hughes in September 2023, she had labs. Was told that she has Fibromyalgia. Saw pain management today, plan to start Gabapenti/Lyrica. Was advised to get Lumbar spine MRI    HPI     H/O Insomnia- stated that trazodone did not help. She was on clonazepam for many years which helped her with sleep and anxiety. Taking Clonazepam, at night helps. Stated that she stopped Arimidex, and her oncologist is aware. Complaining of abdominal bloating since last few years. Has seen Gastroenterologist and work up was negative. History of chronic constipation-. Taking Colace, senna and MiraLax with no relief .    H/O Chronic low back pain- seeing Dr Santiago. H/O RA- Was started on Enbrel, she was on Humira before.     H/O Osteoporosis- Last bone density scan was in 2020, stated Prolia caused stomach upset.     History of vitamin-D deficiency-unable to tolerate vitamin-D tablets, advised to use droplets over-the-counter.     Concerned about weight gain, wants to lose weight. Her weight loss goal is 40 pounds. Stated she lost weight with weight watchers previously. Her activity is limited to back pain.     Discussed weight loss medication options, due to constipation,  In the interim started on metformin,  she stopped it after few weeks as it did not help with weight loss    Review of Systems   Constitutional:  Positive for fatigue. Negative for unexpected weight change.   HENT:  Negative for congestion, ear pain, sinus pain and sore throat.    Respiratory:  Negative for cough, shortness of breath and wheezing.    Cardiovascular:  Negative for chest pain, palpitations and leg swelling.   Gastrointestinal:  Positive for constipation. Negative for abdominal distention, blood in stool and diarrhea.   Genitourinary:  Negative for dysuria, pelvic pain and vaginal bleeding.   Musculoskeletal:  Positive for back pain, neck pain and  "neck stiffness.   Neurological:  Positive for numbness. Negative for dizziness, syncope and weakness.   Hematological:  Negative for adenopathy.   Psychiatric/Behavioral:  Negative for behavioral problems, confusion and suicidal ideas. The patient is nervous/anxious.        Objective   /78 (BP Location: Left arm, Patient Position: Sitting, BP Cuff Size: Large adult)   Pulse 86   Temp 36.1 °C (96.9 °F) (Temporal)   Ht 1.651 m (5' 5\")   Wt 79.4 kg (175 lb)   SpO2 97%   BMI 29.12 kg/m²     Physical Exam  Constitutional:       General: She is not in acute distress.  HENT:      Head: Normocephalic and atraumatic.   Eyes:      Extraocular Movements: Extraocular movements intact.      Pupils: Pupils are equal, round, and reactive to light.   Cardiovascular:      Rate and Rhythm: Normal rate and regular rhythm.   Pulmonary:      Effort: Pulmonary effort is normal.      Breath sounds: Normal breath sounds. No wheezing or rales.   Abdominal:      General: Bowel sounds are normal.      Palpations: Abdomen is soft. There is no mass.      Tenderness: There is no guarding.      Hernia: No hernia is present.   Musculoskeletal:      Right lower leg: No edema.      Left lower leg: No edema.   Skin:     General: Skin is warm and dry.   Neurological:      General: No focal deficit present.      Mental Status: She is alert.      Cranial Nerves: No cranial nerve deficit.   Psychiatric:         Mood and Affect: Mood normal.         Assessment/Plan       Soni was seen today for 3 month follow up.  Diagnoses and all orders for this visit:  Essential hypertension (Primary)  Anxiety disorder, unspecified  -     clonazePAM (KlonoPIN) 1 mg tablet; Take 1 tablet (1 mg) by mouth once daily as needed for anxiety.  Post-menopausal osteoporosis  Vitamin D deficiency  GERD without esophagitis  Systemic involvement of connective tissue, unspecified (CMS/HCC)  Rheumatoid arthritis, involving unspecified site, unspecified whether rheumatoid " factor present (CMS/HCC)  Inflammatory polyarthritis (CMS/HCC)  Ankylosing spondylitis of unspecified sites in spine (CMS/HCC)  Fibromyalgia  Degeneration of intervertebral disc of lumbosacral region  Insomnia, unspecified type       Current Outpatient Medications   Medication Instructions    betamethasone dipropionate (Diprolene) 0.05 % ointment Topical, Daily, apply 1 application externally once daily<BR>    betamethasone, augmented, (Diprolene, augmented,) 0.05 % ointment Topical, 2 times daily    celecoxib (CELEBREX) 200 mg, oral, 2 times daily with meals    clonazePAM (KLONOPIN) 1 mg, oral, Daily PRN    docusate sodium (COLACE) 100 mg, oral, 2 times daily    DULoxetine (CYMBALTA) 60 mg, oral, Daily    lactulose (Constulose) 20 gram/30 mL oral solution Take 3 TSP orally every 12 hours as needed    omeprazole (PRILOSEC) 20 mg, oral, Daily    sulfaSALAzine (AZULFIDINE) 500 mg, oral, Every 12 hours    tiZANidine (ZANAFLEX) 4 mg, oral    traZODone (DESYREL) 150 mg, oral, Nightly    triamterene-hydrochlorothiazid (Maxzide-25) 37.5-25 mg tablet 1 tablet, oral, Daily before breakfast

## 2024-01-18 ENCOUNTER — LAB (OUTPATIENT)
Dept: LAB | Facility: LAB | Age: 64
End: 2024-01-18
Payer: COMMERCIAL

## 2024-01-18 ENCOUNTER — OFFICE VISIT (OUTPATIENT)
Dept: RHEUMATOLOGY | Facility: CLINIC | Age: 64
End: 2024-01-18
Payer: COMMERCIAL

## 2024-01-18 VITALS
WEIGHT: 175 LBS | SYSTOLIC BLOOD PRESSURE: 120 MMHG | OXYGEN SATURATION: 97 % | HEART RATE: 84 BPM | BODY MASS INDEX: 29.16 KG/M2 | HEIGHT: 65 IN | DIASTOLIC BLOOD PRESSURE: 72 MMHG

## 2024-01-18 DIAGNOSIS — M79.7 FIBROMYALGIA SYNDROME: Primary | ICD-10-CM

## 2024-01-18 DIAGNOSIS — M06.9 RHEUMATOID ARTHRITIS INVOLVING MULTIPLE SITES, UNSPECIFIED WHETHER RHEUMATOID FACTOR PRESENT (MULTI): ICD-10-CM

## 2024-01-18 LAB
BASOPHILS # BLD AUTO: 0.06 X10*3/UL (ref 0–0.1)
BASOPHILS NFR BLD AUTO: 0.4 %
CRP SERPL-MCNC: 5.1 MG/DL (ref 0–2)
EOSINOPHIL # BLD AUTO: 0.03 X10*3/UL (ref 0–0.7)
EOSINOPHIL NFR BLD AUTO: 0.2 %
ERYTHROCYTE [DISTWIDTH] IN BLOOD BY AUTOMATED COUNT: 14.4 % (ref 11.5–14.5)
HCT VFR BLD AUTO: 39.6 % (ref 36–46)
HGB BLD-MCNC: 12.6 G/DL (ref 12–16)
IMM GRANULOCYTES # BLD AUTO: 0.06 X10*3/UL (ref 0–0.7)
IMM GRANULOCYTES NFR BLD AUTO: 0.4 % (ref 0–0.9)
LYMPHOCYTES # BLD AUTO: 1.99 X10*3/UL (ref 1.2–4.8)
LYMPHOCYTES NFR BLD AUTO: 12.7 %
MCH RBC QN AUTO: 30.1 PG (ref 26–34)
MCHC RBC AUTO-ENTMCNC: 31.8 G/DL (ref 32–36)
MCV RBC AUTO: 95 FL (ref 80–100)
MONOCYTES # BLD AUTO: 0.56 X10*3/UL (ref 0.1–1)
MONOCYTES NFR BLD AUTO: 3.6 %
NEUTROPHILS # BLD AUTO: 12.91 X10*3/UL (ref 1.2–7.7)
NEUTROPHILS NFR BLD AUTO: 82.7 %
NRBC BLD-RTO: 0 /100 WBCS (ref 0–0)
PLATELET # BLD AUTO: 435 X10*3/UL (ref 150–450)
RBC # BLD AUTO: 4.18 X10*6/UL (ref 4–5.2)
RHEUMATOID FACT SER NEPH-ACNC: 10 IU/ML (ref 0–15)
WBC # BLD AUTO: 15.6 X10*3/UL (ref 4.4–11.3)

## 2024-01-18 PROCEDURE — 86038 ANTINUCLEAR ANTIBODIES: CPT

## 2024-01-18 PROCEDURE — 3074F SYST BP LT 130 MM HG: CPT | Performed by: INTERNAL MEDICINE

## 2024-01-18 PROCEDURE — 86225 DNA ANTIBODY NATIVE: CPT

## 2024-01-18 PROCEDURE — 99214 OFFICE O/P EST MOD 30 MIN: CPT | Performed by: INTERNAL MEDICINE

## 2024-01-18 PROCEDURE — 85025 COMPLETE CBC W/AUTO DIFF WBC: CPT

## 2024-01-18 PROCEDURE — 86431 RHEUMATOID FACTOR QUANT: CPT

## 2024-01-18 PROCEDURE — 3078F DIAST BP <80 MM HG: CPT | Performed by: INTERNAL MEDICINE

## 2024-01-18 PROCEDURE — 36415 COLL VENOUS BLD VENIPUNCTURE: CPT

## 2024-01-18 PROCEDURE — 4004F PT TOBACCO SCREEN RCVD TLK: CPT | Performed by: INTERNAL MEDICINE

## 2024-01-18 PROCEDURE — 86140 C-REACTIVE PROTEIN: CPT

## 2024-01-18 PROCEDURE — 86200 CCP ANTIBODY: CPT

## 2024-01-18 PROCEDURE — 86235 NUCLEAR ANTIGEN ANTIBODY: CPT

## 2024-01-18 PROCEDURE — 86160 COMPLEMENT ANTIGEN: CPT

## 2024-01-18 RX ORDER — SULFASALAZINE 500 MG/1
500 TABLET ORAL EVERY 12 HOURS
Qty: 180 TABLET | Refills: 1 | Status: SHIPPED | OUTPATIENT
Start: 2024-01-18 | End: 2024-03-07 | Stop reason: DRUGHIGH

## 2024-01-18 ASSESSMENT — ROUTINE ASSESSMENT OF PATIENT INDEX DATA (RAPID3)
ON A SCALE OF ONE TO TEN, CONSIDERING ALL THE WAYS IN WHICH ILLNESS AND HEALTH CONDITIONS MAY AFFECT YOU AT THIS TIME, PLEASE INDICATE BELOW HOW YOU ARE DOING:: 5
PARTIPATE_RECREATIONAL_ACTIVITIES: UNABLE TO DO
DRESS_YOURSELF: WITH MUCH DIFFICULTY
TOTAL RAPID3 SCORE: 17
FEELINGS_ANXIETY_NERVOUS: WITH SOME DIFFICULTY
FN_SCORE: 4
ON A SCALE OF ONE TO TEN, HOW MUCH PAIN HAVE YOU HAD BECAUSE OF YOUR CONDITION OVER THE PAST WEEK?: 8
FEELINGS_DEPRESSION: WITHOUT ANY DIFFICULTY
LIFT_CUP_TO_MOUTH: WITHOUT ANY DIFFICULTY
ON A SCALE OF ONE TO TEN, HOW MUCH PAIN HAVE YOU HAD BECAUSE OF YOUR CONDITION OVER THE PAST WEEK?: 8
PICK_CLOTHES_OFF_FLOOR: WITH MUCH DIFFICULTY
ON A SCALE OF ONE TO TEN, CONSIDERING ALL THE WAYS IN WHICH ILLNESS AND HEALTH CONDITIONS MAY AFFECT YOU AT THIS TIME, PLEASE INDICATE BELOW HOW YOU ARE DOING:: 5
TURN_FAUCETS_OFF: WITHOUT ANY DIFFICULTY
IN_OUT_TRANSPORT: WITHOUT ANY DIFFICULTY
WASH_DRY_BODY: WITH SOME DIFFICULTY
IN_OUT_BED: WITHOUT ANY DIFFICULTY
WALK_FLAT_GROUND: WITH SOME DIFFICULTY
WEIGHTED_TOTAL_SCORE: 5.67
WALK_KILOMETERS: UNABLE TO DO
SUM OF QUESTIONS A TO J: 12
GOOD_NIGHTS_SLEEP: WITH MUCH DIFFICULTY
SEVERITY_SCORE: 0

## 2024-01-18 ASSESSMENT — ENCOUNTER SYMPTOMS
ENDOCRINE NEGATIVE: 1
MYALGIAS: 1
HEMATOLOGIC/LYMPHATIC NEGATIVE: 1
NECK STIFFNESS: 1
RESPIRATORY NEGATIVE: 1
DIZZINESS: 1
DEPRESSION: 0
SLEEP DISTURBANCE: 1
LIGHT-HEADEDNESS: 1
ABDOMINAL DISTENTION: 1
LOSS OF SENSATION IN FEET: 0
EYES NEGATIVE: 1
NECK PAIN: 1
CARDIOVASCULAR NEGATIVE: 1
OCCASIONAL FEELINGS OF UNSTEADINESS: 0
BACK PAIN: 1
HEADACHES: 1
ARTHRALGIAS: 1
CONSTIPATION: 1
ABDOMINAL PAIN: 1

## 2024-01-18 ASSESSMENT — PAIN SCALES - GENERAL: PAINLEVEL: 7

## 2024-01-18 ASSESSMENT — JOINT PAIN
TOTAL NUMBER OF TENDER JOINTS: 16
TOTAL NUMBER OF TENDER JOINTS: 20
TOTAL NUMBER OF SWOLLEN JOINTS: 0

## 2024-01-18 NOTE — PROGRESS NOTES
"Chief Complaint:    This patient is a 63 y.o. female who presents with the chief complaint of abrupt onset of marked pain and stiffness of hand, wrist, shoulder, hip, knee, ankle and foot joints.     Subjective:   HPI   Problem:  1: Polyarthralgia, fatigue, body pain       The patient presents today with the abrupt onset of stiffness of multiple joints resulting in her inability to move upon awakening. She slowly moved from the bed to a chair, and from there to the rest room. The pain and stiffness were not apparently associated with joint swelling. Pain is rated about 8/10.       The patient reports marked stiffness and pain involving the hands, wrists, shoulders, hips, knees, ankles and feet. There are apparently differing degrees of pain and stiffness of the joints. This AM she had \"much difficulty\" opening a can of pet food. She was delayed in taking a shower until noon, having gotten up at 5 AM, because she had such difficulty moving and profound pain.        The patient has continued to take SULFASALAZINE 500 mg BID + CELECOXIB 200 mg BID, but these agents did not prevent today's flare.    2. FMS        The patient describes generalized body pain, fatigue, headaches, difficulty sleeping with multiple awakenings, unrefreshed sleep, fair daytime stamina and back pain.          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 The chemotherapy was completed in 2018, at which time she had onset of \"chemo-brain\" but thinks that she did slowly recover, partially. Today is apparently a reoccurrence of impaired thinking processes. Details of the process are vague.          She is also describing short-term memory difficulties.          The patient is being seen by Dr. Santiago, pain management, for lumbar spondylosis and \"nerve root problems\". She has been unable to tolerate PREGABALIN for treatment of her back pain.       Review of Systems " "  Eyes: Negative.    Respiratory: Negative.     Cardiovascular: Negative.    Gastrointestinal:  Positive for abdominal distention, abdominal pain and constipation.   Endocrine: Negative.    Genitourinary: Negative.    Musculoskeletal:  Positive for arthralgias, back pain, myalgias, neck pain and neck stiffness.   Neurological:  Positive for dizziness, light-headedness and headaches.   Hematological: Negative.    Psychiatric/Behavioral:  Positive for sleep disturbance.      Objective:   /72 (BP Location: Right arm, Patient Position: Sitting)   Pulse 84   Ht 1.651 m (5' 5\")   Wt 79.4 kg (175 lb)   SpO2 97%   BMI 29.12 kg/m²      Physical Exam  Vitals and nursing note reviewed.   Constitutional:       General: She is not in acute distress.     Appearance: She is not ill-appearing.      Comments: Patient appears to be in pain, sullen, slowed in responses, and unhappy.   HENT:      Head: Normocephalic.   Eyes:      Extraocular Movements: Extraocular movements intact.      Conjunctiva/sclera: Conjunctivae normal.      Pupils: Pupils are equal, round, and reactive to light.   Neck:      Vascular: No carotid bruit.   Cardiovascular:      Rate and Rhythm: Normal rate and regular rhythm.      Pulses: Normal pulses.      Heart sounds: Normal heart sounds. No murmur heard.     No gallop.   Pulmonary:      Effort: Pulmonary effort is normal. No respiratory distress.      Breath sounds: No wheezing, rhonchi or rales.   Abdominal:      General: Abdomen is flat. Bowel sounds are normal.      Palpations: Abdomen is soft.      Tenderness: There is abdominal tenderness.      Hernia: No hernia is present.   Musculoskeletal:         General: Tenderness present. No swelling, deformity or signs of injury.      Cervical back: Normal range of motion and neck supple. Tenderness present. No rigidity.      Right lower leg: No edema.      Left lower leg: No edema.      Comments: >15 fibromyalgic tender points above/below waist. " "Multiple enthesopathic sites are identified, including bursitis of multiple sites, lateral collateral tendon enthesitis, and lateral > medial epicondylitis. Anserine bursitis is particularly painful.   Lymphadenopathy:      Cervical: No cervical adenopathy.   Neurological:      Mental Status: She is alert.        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 Time was spent discussing each dx above. The patient has clinical findings and a history compatible with FMS. I believe today's \"flare\" represents a FMS flare. There are no findings on exam consistent with an inflammatory polyarthritis. Based on my initial assessment in 9/2023, I also raised the possibility of a palindromic rheumatism. The rationale for this diagnosis is detailed that office visit's note. I still think, at this moment, that this diagnosis is tenable, and therefore I recommend to the patient that further lab testing be done and she is in agreement. The rationale, in particular, is predicated on the findings of +CHELY and RA-associated autoabs CEP1 and Sa as well as a modest elevation of IL-6 level. Finally, I reassured patient that the treatment she is currently using--SULFASALAZINE + CELECOXIB + DULOXETINE --is reasonable for now. I did discuss briefly with the patient about concerns for prolonged use of NSAIDs. This patient should be followed and monitored to determine whether the process declares itself as an autoimmune disorder associated with FMS or FMS alone.     Yadiel Hughes MD   "

## 2024-01-19 LAB
ANA PATTERN: ABNORMAL
ANA SER QL HEP2 SUBST: POSITIVE
ANA TITR SER IF: ABNORMAL {TITER}
C3 SERPL-MCNC: 163 MG/DL (ref 87–200)
C4 SERPL-MCNC: 31 MG/DL (ref 10–50)
CCP IGG SERPL-ACNC: <1 U/ML
CENTROMERE B AB SER-ACNC: <0.2 AI
CHROMATIN AB SERPL-ACNC: <0.2 AI
DSDNA AB SER-ACNC: 1 IU/ML
ENA JO1 AB SER QL IA: <0.2 AI
ENA RNP AB SER IA-ACNC: 0.5 AI
ENA SCL70 AB SER QL IA: 3.4 AI
ENA SM AB SER IA-ACNC: <0.2 AI
ENA SM+RNP AB SER QL IA: <0.2 AI
ENA SS-A AB SER IA-ACNC: <0.2 AI
ENA SS-B AB SER IA-ACNC: <0.2 AI
RIBOSOMAL P AB SER-ACNC: <0.2 AI

## 2024-01-24 LAB
SCAN RESULT: NORMAL
SCAN RESULT: NORMAL

## 2024-01-25 ENCOUNTER — APPOINTMENT (OUTPATIENT)
Dept: OBSTETRICS AND GYNECOLOGY | Facility: CLINIC | Age: 64
End: 2024-01-25
Payer: COMMERCIAL

## 2024-01-26 LAB — C1Q SERPL-MCNC: 15.3 MG/DL (ref 10.3–20.5)

## 2024-01-29 ENCOUNTER — TELEPHONE (OUTPATIENT)
Dept: OBSTETRICS AND GYNECOLOGY | Facility: CLINIC | Age: 64
End: 2024-01-29
Payer: COMMERCIAL

## 2024-01-29 NOTE — TELEPHONE ENCOUNTER
Est  CS pt last seen 12/12/2023 lichen sclerosis / pt calling states that she is having urinary frequency / burning for the past 2 days / requesting atb / advised Dr Fernandes likes pt come in a give urine specimen / pt sattes that she has rheumatoid arthritis and not feeling well enough to come  in today / Advised Macrobid 100 mg 1 po bid x 7 days / to increase water intake / if not feeling better in 4-5  days to call office for appt / Macrobid  100 mg 1 po bid x 7 days qty 14 no refills called to [facility] listed

## 2024-01-30 DIAGNOSIS — M06.9 RHEUMATOID ARTHRITIS INVOLVING MULTIPLE SITES, UNSPECIFIED WHETHER RHEUMATOID FACTOR PRESENT (MULTI): ICD-10-CM

## 2024-01-30 RX ORDER — SULFASALAZINE 500 MG/1
500 TABLET ORAL EVERY 12 HOURS
Qty: 180 TABLET | Refills: 1 | OUTPATIENT
Start: 2024-01-30 | End: 2024-04-29

## 2024-02-20 ENCOUNTER — HOSPITAL ENCOUNTER (OUTPATIENT)
Dept: RADIOLOGY | Facility: HOSPITAL | Age: 64
Discharge: HOME | End: 2024-02-20
Payer: COMMERCIAL

## 2024-02-20 ENCOUNTER — HOSPITAL ENCOUNTER (OUTPATIENT)
Dept: OPERATING ROOM | Facility: HOSPITAL | Age: 64
Discharge: HOME | End: 2024-02-20
Payer: COMMERCIAL

## 2024-02-20 VITALS
RESPIRATION RATE: 15 BRPM | HEIGHT: 65 IN | OXYGEN SATURATION: 100 % | BODY MASS INDEX: 28.32 KG/M2 | DIASTOLIC BLOOD PRESSURE: 98 MMHG | WEIGHT: 170 LBS | TEMPERATURE: 96.8 F | HEART RATE: 78 BPM | SYSTOLIC BLOOD PRESSURE: 149 MMHG

## 2024-02-20 DIAGNOSIS — M54.16 LUMBAR RADICULOPATHY: ICD-10-CM

## 2024-02-20 PROCEDURE — 64483 NJX AA&/STRD TFRM EPI L/S 1: CPT | Performed by: ANESTHESIOLOGY

## 2024-02-20 PROCEDURE — 64484 NJX AA&/STRD TFRM EPI L/S EA: CPT | Performed by: ANESTHESIOLOGY

## 2024-02-20 PROCEDURE — 2500000005 HC RX 250 GENERAL PHARMACY W/O HCPCS: Performed by: ANESTHESIOLOGY

## 2024-02-20 PROCEDURE — 2500000004 HC RX 250 GENERAL PHARMACY W/ HCPCS (ALT 636 FOR OP/ED): Performed by: ANESTHESIOLOGY

## 2024-02-20 PROCEDURE — 2550000001 HC RX 255 CONTRASTS: Performed by: ANESTHESIOLOGY

## 2024-02-20 RX ORDER — TRIAMCINOLONE ACETONIDE 40 MG/ML
INJECTION, SUSPENSION INTRA-ARTICULAR; INTRAMUSCULAR AS NEEDED
Status: COMPLETED | OUTPATIENT
Start: 2024-02-20 | End: 2024-02-20

## 2024-02-20 RX ORDER — LIDOCAINE HYDROCHLORIDE 5 MG/ML
INJECTION, SOLUTION INFILTRATION; PERINEURAL AS NEEDED
Status: COMPLETED | OUTPATIENT
Start: 2024-02-20 | End: 2024-02-20

## 2024-02-20 RX ADMIN — LIDOCAINE HYDROCHLORIDE 10 ML: 5 INJECTION, SOLUTION INFILTRATION; PERINEURAL at 11:22

## 2024-02-20 RX ADMIN — IOHEXOL 2 ML: 240 INJECTION, SOLUTION INTRATHECAL; INTRAVASCULAR; INTRAVENOUS; ORAL at 11:23

## 2024-02-20 RX ADMIN — TRIAMCINOLONE ACETONIDE 60 MG: 40 INJECTION, SUSPENSION INTRA-ARTICULAR; INTRAMUSCULAR at 11:24

## 2024-02-20 ASSESSMENT — PAIN SCALES - GENERAL
PAINLEVEL_OUTOF10: 10 - WORST POSSIBLE PAIN
PAINLEVEL_OUTOF10: 2

## 2024-02-20 ASSESSMENT — PAIN - FUNCTIONAL ASSESSMENT
PAIN_FUNCTIONAL_ASSESSMENT: 0-10
PAIN_FUNCTIONAL_ASSESSMENT: 0-10

## 2024-02-20 NOTE — DISCHARGE INSTRUCTIONS
You underwent a procedure today    After most procedures, it is recommended that you relax and limit your activity for the remainder of the day unless you have been told otherwise by your pain physician.  You should not drive a car, operate machinery, or make important legal decisions unless otherwise directed by your pain physician.  You may resume your normal activity, including exercise, tomorrow.      Keep a written pain diary of how much pain relief you experienced following the procedure and the length of time of pain relief you experienced pain relief. Following diagnostic injections like medial branch nerve blocks, sacroiliac joint blocks, stellate ganglion injections and other blocks, it is very important you record the specific amount of pain relief you experienced immediately after the injectionand how long it lasted. Your doctor will ask you for this information at your follow up visit.     For all injections, please keep the injection site dry and inspect the site for a couple of days. You may remove the Band-Aid the day of the injection at any time.     Some discomfort, bruising or slight swelling may occur at the injection site. This is not abnormal if it occurs.  If needed you may:    -Take over the counter medication such as Tylenol or Motrin.   -Apply an ice pack for 30 minutes, 2 to 3 times a day for the first 24 hours.     You may shower today; no soaking baths, hot tubs, whirlpools or swimming pools for two days.      If you are given steroids in your injection, it may take 3-5 days for the steroid medication to take effect. You may notice a worsening of your symptoms for 1-2 days after the injection. This is not abnormal.  You may use acetaminophen, ibuprofen, or prescription medication that your doctor may have prescribed for you if you need to do so.     A few common side effects of steroids include facial flushing, sweating, restlessness, irritability,difficulty sleeping, increase in blood  sugar, and increased blood pressure. If you have diabetes, please monitor your blood sugar at least once a day for at least 5 days. If you have poorly controlled high blood pressure, monitoryour blood pressure for at least 2 days and contact your primary care physician if these numbers are unusually high for you.      If you take aspirin or non-steroidal anti-inflammatory drugs (examples are Motrin, Advil, ibuprofen, Naprosyn, Voltaren, Relafen, etc.) you may restart these this evening.    You do not need to discontinue non-aspirin-containing pain medications prior to an injection (examples: Celebrex, tramadol, hydrocodone and acetaminophen).      If you take a blood thinning medication (Coumadin, Lovenox, Fragmin,Ticlid, Plavix, Pradaxa, etc.), please discuss this with your primary care physician/cardiologist and your pain physician. These medications MUST be discontinued before you can have an injection safely, without the risk of uncontrolled bleeding. If these medications are not discontinued for an appropriate period of time, you will not be able to receivean injection.      If you are taking Coumadin, please have your INR checked the morning of your procedure and bringthe result to your appointment unless otherwise instructed. If your INR is over 1.2, your injection may need to be rescheduled to avoid uncontrolled bleeding from the needle placement.     Call [facility] at [phone] between 8am-4pm Monday - Friday if you are experiencing the following:    If you received an epidural or spinal injection:    -Headache that doesnot go away with medicine, is worse when sitting or standing up, and is greatly relieved upon lying down.   -Severe pain worse than or different than your baseline pain.   -Chills or fever (101º F or greater).   -Drainage or signs of infection at the injection site     Go directly to the Emergency Department if you are experiencing the following and received an  epidural or spinal injection:   -Abrupt weakness or progressive weakness in your legs that starts after you leave the clinic.   -Abrupt severe or worsening numbness in your legs.   -Inability to urinate after the injection or loss of bowel or bladder control without the urge to defecate or urinate.     If you have a clinical question that cannot wait until your next appointment, please call [phone] between 8am-4pm Monday - Friday or send a [employer] message. We do our best to return all non-emergency messages within 24 hours, Monday - Friday. A nurse or physician will return your message.      If you need to cancel an appointment, please call the scheduling staff at [phone] during normal business hours or leave a message at least 24 hours in advance.     If you are going to be sedated for your next procedure, you MUST have responsible adult who can legally drive accompany you home. You cannot eat or drink for eight hours prior to the planned procedure if you are going to receive sedation. You may take your non-blood thinning medications with a small sip of water.

## 2024-02-20 NOTE — OP NOTE
* No procedures listed * Operative Note     Date: 2024  OR Location: [facility] GI [facility] Endosc1 OR    Name: Soni Ochoa, : 1960, Age: 63 y.o., MRN: [id number], Sex: female    Diagnosis  * No Diagnosis Codes entered * * No Diagnosis Codes entered *     Procedures  * No procedures documented on diagnosis form *    Surgeons   * No surgeons found in log *    Resident/Fellow/Other Assistant:  * No surgeons found in log *    Procedure Summary  Anesthesia: * No anesthesia type entered *  ASA: ASA status not filed in the log.  Anesthesia Staff: No anesthesia staff entered.  Estimated Blood Loss: 0mL  Intra-op Medications: * Intraprocedure medication information is unavailable because the case start and end events have not been set *      Intraprocedure I/O Totals       None           Specimen: No specimens collected     Staff:   No surgical staff documented.         Drains and/or Catheters: * None in log *    Tourniquet Times:         Implants:     Findings:     Indications: Soni Ochoa is an 63 y.o. female who is having surgery for * No pre-op diagnosis entered *.     The patient was seen in the preoperative area. The risks, benefits, complications, treatment options, non-operative alternatives, expected recovery and outcomes were discussed with the patient. The possibilities of reaction to medication, pulmonary aspiration, injury to surrounding structures, bleeding, recurrent infection, the need for additional procedures, failure to diagnose a condition, and creating a complication requiring transfusion or operation were discussed with the patient. The patient concurred with the proposed plan, giving informed consent.  The site of surgery was properly noted/marked if necessary per policy. The patient has been actively warmed in preoperative area. Preoperative antibiotics are not indicated. Venous thrombosis prophylaxis are not indicated.    Procedure Details: The patient was brought to the procedure room  and placed in prone position.  Sterile prep and drape with ChloraPrep and a fenestrated drape.  10 mL of half percent lidocaine were injected through a 25-gauge spinal needle for local anesthesia.  22-gauge pencil point needles were guided to the L4 and L5 foramen on the left under fluoroscopic guidance.  Contrast was injected under live fluoroscopy to ensure proper needle placement.  Then 60 mg of triamcinolone and 2 mL of half percent lidocaine were injected through the needles in divided doses.  The needles were removed and the patient was transferred to recovery.   Complications:  None; patient tolerated the procedure well.    Disposition: PACU - hemodynamically stable.  Condition: stable         Additional Details:     Attending Attestation: I performed the procedure.    *No primary surgeon found*

## 2024-02-21 RX ORDER — DULOXETIN HYDROCHLORIDE 60 MG/1
60 CAPSULE, DELAYED RELEASE ORAL DAILY
Qty: 30 CAPSULE | Refills: 5 | Status: SHIPPED | OUTPATIENT
Start: 2024-02-21 | End: 2024-04-15 | Stop reason: ALTCHOICE

## 2024-03-07 ENCOUNTER — OFFICE VISIT (OUTPATIENT)
Dept: RHEUMATOLOGY | Facility: CLINIC | Age: 64
End: 2024-03-07
Payer: COMMERCIAL

## 2024-03-07 VITALS
DIASTOLIC BLOOD PRESSURE: 80 MMHG | HEART RATE: 92 BPM | WEIGHT: 173 LBS | BODY MASS INDEX: 28.82 KG/M2 | OXYGEN SATURATION: 98 % | SYSTOLIC BLOOD PRESSURE: 144 MMHG | HEIGHT: 65 IN

## 2024-03-07 DIAGNOSIS — M06.4 INFLAMMATORY POLYARTHRITIS (MULTI): ICD-10-CM

## 2024-03-07 DIAGNOSIS — M34.9 SYSTEMIC SCLEROSIS (MULTI): ICD-10-CM

## 2024-03-07 DIAGNOSIS — R76.8 POSITIVE ANA (ANTINUCLEAR ANTIBODY): Primary | ICD-10-CM

## 2024-03-07 PROCEDURE — 99214 OFFICE O/P EST MOD 30 MIN: CPT | Performed by: INTERNAL MEDICINE

## 2024-03-07 PROCEDURE — 3079F DIAST BP 80-89 MM HG: CPT | Performed by: INTERNAL MEDICINE

## 2024-03-07 PROCEDURE — 3077F SYST BP >= 140 MM HG: CPT | Performed by: INTERNAL MEDICINE

## 2024-03-07 PROCEDURE — 4004F PT TOBACCO SCREEN RCVD TLK: CPT | Performed by: INTERNAL MEDICINE

## 2024-03-07 RX ORDER — SULFASALAZINE 500 MG/1
1000 TABLET, DELAYED RELEASE ORAL 2 TIMES DAILY
Qty: 120 TABLET | Refills: 5 | Status: SHIPPED | OUTPATIENT
Start: 2024-03-07 | End: 2024-09-03

## 2024-03-07 ASSESSMENT — ENCOUNTER SYMPTOMS
OCCASIONAL FEELINGS OF UNSTEADINESS: 0
DEPRESSION: 0
LOSS OF SENSATION IN FEET: 0

## 2024-03-07 ASSESSMENT — JOINT PAIN
TOTAL NUMBER OF TENDER JOINTS: 0
TOTAL NUMBER OF SWOLLEN JOINTS: 0

## 2024-03-07 ASSESSMENT — ROUTINE ASSESSMENT OF PATIENT INDEX DATA (RAPID3)
LIFT_CUP_TO_MOUTH: WITHOUT ANY DIFFICULTY
GOOD_NIGHTS_SLEEP: WITH SOME DIFFICULTY
SEVERITY_SCORE: 0
DRESS_YOURSELF: WITH SOME DIFFICULTY
ON A SCALE OF ONE TO TEN, HOW MUCH PAIN HAVE YOU HAD BECAUSE OF YOUR CONDITION OVER THE PAST WEEK?: 8
IN_OUT_BED: WITHOUT ANY DIFFICULTY
TURN_FAUCETS_OFF: WITHOUT ANY DIFFICULTY
ON A SCALE OF ONE TO TEN, HOW MUCH PAIN HAVE YOU HAD BECAUSE OF YOUR CONDITION OVER THE PAST WEEK?: 8
ON A SCALE OF ONE TO TEN, CONSIDERING ALL THE WAYS IN WHICH ILLNESS AND HEALTH CONDITIONS MAY AFFECT YOU AT THIS TIME, PLEASE INDICATE BELOW HOW YOU ARE DOING:: 5
FEELINGS_ANXIETY_NERVOUS: WITH SOME DIFFICULTY
WEIGHTED_TOTAL_SCORE: 5.77
WASH_DRY_BODY: WITH SOME DIFFICULTY
PARTIPATE_RECREATIONAL_ACTIVITIES: UNABLE TO DO
FN_SCORE: 4.3
WALK_KILOMETERS: UNABLE TO DO
FEELINGS_DEPRESSION: WITHOUT ANY DIFFICULTY
ON A SCALE OF ONE TO TEN, CONSIDERING ALL THE WAYS IN WHICH ILLNESS AND HEALTH CONDITIONS MAY AFFECT YOU AT THIS TIME, PLEASE INDICATE BELOW HOW YOU ARE DOING:: 5
TOTAL RAPID3 SCORE: 17.3
IN_OUT_TRANSPORT: WITH SOME DIFFICULTY
PICK_CLOTHES_OFF_FLOOR: WITH SOME DIFFICULTY
WALK_FLAT_GROUND: UNABLE TO DO
SUM OF QUESTIONS A TO J: 13

## 2024-03-07 ASSESSMENT — PATIENT HEALTH QUESTIONNAIRE - PHQ9
2. FEELING DOWN, DEPRESSED OR HOPELESS: NOT AT ALL
1. LITTLE INTEREST OR PLEASURE IN DOING THINGS: NOT AT ALL
SUM OF ALL RESPONSES TO PHQ9 QUESTIONS 1 AND 2: 0

## 2024-03-07 ASSESSMENT — PAIN SCALES - GENERAL: PAINLEVEL: 7

## 2024-03-07 NOTE — PROGRESS NOTES
"Chief Complaint:  This 63 y.o. female presents with the chief complaint of polyarthralgia, fatigue, and generalized body pain.     Subjective:   HPI   Problem:  1: Polyarthalgia, fatigue and body pain       Since the patient's previous visit on 1/18/2024, the patient reports feeling more stiff and more swollen.       The patient reports swelling of the hands, feet, and left knee.        The patient's average pain over the past week has been 8/10. The pain is generalized involving the trunk and extremities. Pain is characterized as dull, aching, and is generally persistent \"at some level\". Her muscles feel \"strained\".        The patient has continued use of SULFASALAZINE 500 mg BID (although she has previously taken 1 gm BID) + CELECOXIB 200 mg BID. These agents provide only modest pain relief.        A significant change since her previous visit is more swelling of the hands, feet and left knee. She has difficulty performing many activities, such as household chores, cooking, laundry; she no longer does the grocery shopping.        The patient denies: rashes, hair loss, painful oral/nasal sores, lymphadenopathy, frequent serious headaches, pleurisy, myopericarditis, hypercoagulability, kidney disease, hypertension, lung disorder, psychosis, Raynaud's, vasculitis, or organic brain syndrome. Also, there is no h/o of retinopathy.    Review of Systems   All other systems reviewed and are negative.    Objective:   /80 (BP Location: Right arm, Patient Position: Sitting)   Pulse 92   Ht 1.651 m (5' 5\")   Wt 78.5 kg (173 lb)   SpO2 98%   BMI 28.79 kg/m²      Physical Exam  Vitals and nursing note reviewed.   Constitutional:       General: She is not in acute distress.     Appearance: Normal appearance. She is not ill-appearing.   HENT:      Head: Normocephalic.   Eyes:      Extraocular Movements: Extraocular movements intact.      Conjunctiva/sclera: Conjunctivae normal.      Pupils: Pupils are equal, round, and " reactive to light.   Neck:      Vascular: No carotid bruit.   Cardiovascular:      Rate and Rhythm: Normal rate and regular rhythm.      Pulses: Normal pulses.      Heart sounds: Normal heart sounds. No murmur heard.     Comments: Pedal pulses 1+ compared to radial and ulnar 2+; no fixed Raynaud's, no digital ulcers of feet  Pulmonary:      Effort: Pulmonary effort is normal. No respiratory distress.      Breath sounds: Normal breath sounds. No wheezing, rhonchi or rales.   Abdominal:      General: There is no distension.      Tenderness: There is no abdominal tenderness.      Comments: Mild-moderate abdominal obesity; no organomegaly   Musculoskeletal:      Cervical back: Normal range of motion and neck supple. No rigidity or tenderness.   Lymphadenopathy:      Cervical: No cervical adenopathy.   Neurological:      Mental Status: She is alert.           No active synovitis; no swelling/warmth/erythema of knees; >20 fibromyalgic tender points  above/below waist in characteristic regions with negative control points.  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 Although the patient's exam does not identify any evidence of cutaneous sclerosis or systemic sclerosis, the presence of this antibody, if confirmed, raises concern for systemic sclerosis. I have explained this to the patient, and she is agreeable to have further testing done.  Patient to return to office in 4 weeks.       Yadiel Hughes MD

## 2024-03-08 ENCOUNTER — APPOINTMENT (OUTPATIENT)
Dept: PAIN MEDICINE | Facility: CLINIC | Age: 64
End: 2024-03-08
Payer: COMMERCIAL

## 2024-03-12 ENCOUNTER — APPOINTMENT (OUTPATIENT)
Dept: OBSTETRICS AND GYNECOLOGY | Facility: CLINIC | Age: 64
End: 2024-03-12
Payer: COMMERCIAL

## 2024-03-20 ENCOUNTER — LAB (OUTPATIENT)
Dept: LAB | Facility: LAB | Age: 64
End: 2024-03-20
Payer: COMMERCIAL

## 2024-03-20 DIAGNOSIS — M34.9 SYSTEMIC SCLEROSIS (MULTI): ICD-10-CM

## 2024-03-20 DIAGNOSIS — R76.8 POSITIVE ANA (ANTINUCLEAR ANTIBODY): ICD-10-CM

## 2024-03-20 DIAGNOSIS — M06.4 INFLAMMATORY POLYARTHRITIS (MULTI): ICD-10-CM

## 2024-03-20 LAB
CARDIOLIPIN IGA SERPL-ACNC: 2.5 APL U/ML
CARDIOLIPIN IGG SER IA-ACNC: <1.6 GPL U/ML
CARDIOLIPIN IGM SER IA-ACNC: 0.6 MPL U/ML
CRP SERPL-MCNC: <0.3 MG/DL (ref 0–2)
ENA SCL70 AB SER QL IA: 3.5 AI
ERYTHROCYTE [SEDIMENTATION RATE] IN BLOOD BY WESTERGREN METHOD: 17 MM/H (ref 0–30)

## 2024-03-20 PROCEDURE — 86140 C-REACTIVE PROTEIN: CPT

## 2024-03-20 PROCEDURE — 36415 COLL VENOUS BLD VENIPUNCTURE: CPT

## 2024-03-20 PROCEDURE — 86147 CARDIOLIPIN ANTIBODY EA IG: CPT

## 2024-03-20 PROCEDURE — 85652 RBC SED RATE AUTOMATED: CPT

## 2024-03-20 PROCEDURE — 86235 NUCLEAR ANTIGEN ANTIBODY: CPT

## 2024-03-25 LAB — SCAN RESULT: NORMAL

## 2024-04-05 ENCOUNTER — APPOINTMENT (OUTPATIENT)
Dept: PAIN MEDICINE | Facility: CLINIC | Age: 64
End: 2024-04-05
Payer: COMMERCIAL

## 2024-04-08 ENCOUNTER — APPOINTMENT (OUTPATIENT)
Dept: PAIN MEDICINE | Facility: CLINIC | Age: 64
End: 2024-04-08
Payer: COMMERCIAL

## 2024-04-11 ENCOUNTER — OFFICE VISIT (OUTPATIENT)
Dept: RHEUMATOLOGY | Facility: CLINIC | Age: 64
End: 2024-04-11
Payer: COMMERCIAL

## 2024-04-11 VITALS
SYSTOLIC BLOOD PRESSURE: 126 MMHG | HEART RATE: 79 BPM | HEIGHT: 65 IN | OXYGEN SATURATION: 97 % | WEIGHT: 176.6 LBS | BODY MASS INDEX: 29.42 KG/M2 | DIASTOLIC BLOOD PRESSURE: 70 MMHG

## 2024-04-11 DIAGNOSIS — R76.8 SCL-70 ANTIBODY POSITIVE: ICD-10-CM

## 2024-04-11 DIAGNOSIS — M79.7 FIBROMYALGIA SYNDROME: ICD-10-CM

## 2024-04-11 DIAGNOSIS — R76.8 POSITIVE ANA (ANTINUCLEAR ANTIBODY): Primary | ICD-10-CM

## 2024-04-11 DIAGNOSIS — M35.9 UNDIFFERENTIATED CONNECTIVE TISSUE DISEASE (MULTI): ICD-10-CM

## 2024-04-11 PROCEDURE — 99214 OFFICE O/P EST MOD 30 MIN: CPT | Performed by: INTERNAL MEDICINE

## 2024-04-11 PROCEDURE — 3074F SYST BP LT 130 MM HG: CPT | Performed by: INTERNAL MEDICINE

## 2024-04-11 PROCEDURE — 3078F DIAST BP <80 MM HG: CPT | Performed by: INTERNAL MEDICINE

## 2024-04-11 PROCEDURE — 4004F PT TOBACCO SCREEN RCVD TLK: CPT | Performed by: INTERNAL MEDICINE

## 2024-04-11 ASSESSMENT — ROUTINE ASSESSMENT OF PATIENT INDEX DATA (RAPID3)
PICK_CLOTHES_OFF_FLOOR: WITH SOME DIFFICULTY
DRESS_YOURSELF: WITHOUT ANY DIFFICULTY
WASH_DRY_BODY: WITHOUT ANY DIFFICULTY
SUM OF QUESTIONS A TO J: 11
IN_OUT_BED: WITHOUT ANY DIFFICULTY
ON A SCALE OF ONE TO TEN, CONSIDERING ALL THE WAYS IN WHICH ILLNESS AND HEALTH CONDITIONS MAY AFFECT YOU AT THIS TIME, PLEASE INDICATE BELOW HOW YOU ARE DOING:: 6
GOOD_NIGHTS_SLEEP: WITH SOME DIFFICULTY
TOTAL RAPID3 SCORE: 16.7
TURN_FAUCETS_OFF: WITHOUT ANY DIFFICULTY
LIFT_CUP_TO_MOUTH: WITHOUT ANY DIFFICULTY
IN_OUT_TRANSPORT: WITH SOME DIFFICULTY
WALK_KILOMETERS: UNABLE TO DO
ON A SCALE OF ONE TO TEN, CONSIDERING ALL THE WAYS IN WHICH ILLNESS AND HEALTH CONDITIONS MAY AFFECT YOU AT THIS TIME, PLEASE INDICATE BELOW HOW YOU ARE DOING:: 6
SEVERITY_SCORE: 0
FEELINGS_ANXIETY_NERVOUS: WITHOUT ANY DIFFICULTY
PARTIPATE_RECREATIONAL_ACTIVITIES: UNABLE TO DO
ON A SCALE OF ONE TO TEN, HOW MUCH PAIN HAVE YOU HAD BECAUSE OF YOUR CONDITION OVER THE PAST WEEK?: 7
FN_SCORE: 3.7
WALK_FLAT_GROUND: UNABLE TO DO
FEELINGS_DEPRESSION: WITHOUT ANY DIFFICULTY
ON A SCALE OF ONE TO TEN, HOW MUCH PAIN HAVE YOU HAD BECAUSE OF YOUR CONDITION OVER THE PAST WEEK?: 7
WEIGHTED_TOTAL_SCORE: 5.57

## 2024-04-11 ASSESSMENT — PATIENT HEALTH QUESTIONNAIRE - PHQ9
1. LITTLE INTEREST OR PLEASURE IN DOING THINGS: MORE THAN HALF THE DAYS
2. FEELING DOWN, DEPRESSED OR HOPELESS: NOT AT ALL
10. IF YOU CHECKED OFF ANY PROBLEMS, HOW DIFFICULT HAVE THESE PROBLEMS MADE IT FOR YOU TO DO YOUR WORK, TAKE CARE OF THINGS AT HOME, OR GET ALONG WITH OTHER PEOPLE: SOMEWHAT DIFFICULT
SUM OF ALL RESPONSES TO PHQ9 QUESTIONS 1 AND 2: 2

## 2024-04-11 ASSESSMENT — ENCOUNTER SYMPTOMS
FREQUENCY: 0
NECK STIFFNESS: 0
DYSURIA: 0
EYES NEGATIVE: 1
DIAPHORESIS: 1
LOSS OF SENSATION IN FEET: 1
FATIGUE: 1
DIFFICULTY URINATING: 0
TROUBLE SWALLOWING: 0
BACK PAIN: 0
OCCASIONAL FEELINGS OF UNSTEADINESS: 1
SINUS PAIN: 0
ENDOCRINE NEGATIVE: 1
ARTHRALGIAS: 1
FACIAL SWELLING: 0
ADENOPATHY: 0
CONFUSION: 0
LIGHT-HEADEDNESS: 0
SEIZURES: 0
DIZZINESS: 0
SORE THROAT: 0
STRIDOR: 0
NUMBNESS: 1
HEADACHES: 0
HEMATURIA: 0
SINUS PRESSURE: 0
MYALGIAS: 1
JOINT SWELLING: 1
SHORTNESS OF BREATH: 0
FLANK PAIN: 0
AGITATION: 0
DEPRESSION: 0
NECK PAIN: 0
CARDIOVASCULAR NEGATIVE: 1
GASTROINTESTINAL NEGATIVE: 1
RESPIRATORY NEGATIVE: 1

## 2024-04-11 ASSESSMENT — JOINT PAIN
TOTAL NUMBER OF TENDER JOINTS: 0
TOTAL NUMBER OF SWOLLEN JOINTS: 0
TOTAL NUMBER OF TENDER JOINTS: 2

## 2024-04-11 ASSESSMENT — PAIN SCALES - GENERAL: PAINLEVEL_OUTOF10: 7

## 2024-04-11 ASSESSMENT — PAIN - FUNCTIONAL ASSESSMENT: PAIN_FUNCTIONAL_ASSESSMENT: 0-10

## 2024-04-11 NOTE — PROGRESS NOTES
Chief Complaint:    This 63 y.o. female presents with the chief complaints of fibromyalgia syndrome (FMS) and positive CHELY/Scl serologies.     Subjective:   HPI   Problem:  1: FMS       The patient reports persistent symptoms of FMS, including chronic fatigue, poor quality of sleep, generalized body pain, arthralgia, myalgia, impaired short-term memory and concentration, among other symptoms.       The patient is currently on treatment with DULOXETINE 60 mg daily + CELECOXIB 200 mg BID. Although this combination treatment provides modest pain relief, the patient continues to have symptoms detailed above. She reiterates feeling poorly most of the time, dealing with poor stamina, and non-restorative sleep associated with multiple nocturnal awakenings.         The patient also reiterates that on awakening she has difficulty starting her days due to poor stamina, and states that it takes hours throughout mornings until she is feeling up to engaging in various ordinary daily activities and chores.          The patient sometimes thinks some small hand joints are swollen. However, this is short-lived and apparently does not further hinder her daily routine. There is no persistent joint swelling, loss of joint mobility, or new evolving joint deformities. She can use her hands for all ordinary daily activities.           The patient mentions that SULFASALAZINE does provide some modest benefit, and would like to remain on this agent.    2. Positive CHELY & anti-Scl70 serologies           The patient was found to have a +anti-Scl70 autoantibody, a new finding she was not aware of until I explained the finding recently.             However, in particular, the patient denies any new or pre-existing symptoms consistent with progressive systemic sclerosis. These symptoms were detailed for the patient, and she denies their existence. In particular, she has no skin symptoms whatsoever, and she has looked since our recent discussion.  "For example, she denies morphea, binding down or tightening of the skin of the hands, or hand puffiness. There are no findings of sclerodactyly, increasingly impaired usage or visualization of sclerodermatous changes of hands, or changes of the skin of the face, neck and anterior chest wall. She denies Raynaud's symptoms.  Blood pressures have remained stable.    Review of Systems   Constitutional:  Positive for diaphoresis and fatigue.   HENT:  Negative for dental problem, drooling, facial swelling, mouth sores, sinus pressure, sinus pain, sore throat, tinnitus and trouble swallowing.    Eyes: Negative.    Respiratory: Negative.  Negative for shortness of breath and stridor.    Cardiovascular: Negative.    Gastrointestinal: Negative.    Endocrine: Negative.    Genitourinary: Negative.  Negative for decreased urine volume, difficulty urinating, dysuria, flank pain, frequency, hematuria and urgency.   Musculoskeletal:  Positive for arthralgias, joint swelling and myalgias. Negative for back pain, gait problem, neck pain and neck stiffness.   Skin:  Negative for pallor.   Neurological:  Positive for numbness. Negative for dizziness, seizures, light-headedness and headaches.   Hematological:  Negative for adenopathy.   Psychiatric/Behavioral:  Negative for agitation and confusion.        Objective:   /70   Pulse 79   Ht 1.651 m (5' 5\")   Wt 80.1 kg (176 lb 9.6 oz)   SpO2 97%   BMI 29.39 kg/m²      Physical Exam  Vitals and nursing note reviewed.   Constitutional:       General: She is not in acute distress.     Appearance: Normal appearance. She is not ill-appearing.   HENT:      Head: Normocephalic.   Eyes:      Extraocular Movements: Extraocular movements intact.      Conjunctiva/sclera: Conjunctivae normal.      Pupils: Pupils are equal, round, and reactive to light.   Neck:      Vascular: No carotid bruit.   Cardiovascular:      Rate and Rhythm: Normal rate and regular rhythm.      Pulses: Normal pulses. "      Heart sounds: Normal heart sounds. No murmur heard.     Comments: Pedal pulses 1+ compared to radial and ulnar 2+; no fixed Raynaud's, no digital ulcers of feet  Pulmonary:      Effort: Pulmonary effort is normal. No respiratory distress.      Breath sounds: Normal breath sounds. No wheezing, rhonchi or rales.   Abdominal:      General: There is no distension.      Tenderness: There is no abdominal tenderness.      Comments: Mild-moderate abdominal obesity; no organomegaly   Musculoskeletal:      Cervical back: Normal range of motion and neck supple. No rigidity or tenderness.   Lymphadenopathy:      Cervical: No cervical adenopathy.   Neurological:      Mental Status: She is alert.        No active synovitis; >15 fibromyalgic points above/below waist.     Assessment:   Fibromyalgia syndrome (FMS) - M79.7  Elevated CHELY - R76.8  Elevated anti-Scl 70 autoantibody - R76.8  4.   Question of undifferentiated connective tissue disorder - M35.9    Plan:    FMS: Patient is requested to gradually taper off CELECOXIB due to potential adverse effects of hypertension, renal function, ASCVD and hypercoagulable states such as CVA and MI. Patient will start by reducing to one capsule daily. She will discuss with Dr. Santiago, pain management. It is recommended patient transition to APAP 1300 mg daily or twice daily for pain. She will consider these recommendations. She will continue DULOXETINE, TRAZODONE, and other current medications per discussion.  Query UCTD: Patient documented to have +CHELY and +Scl 70 autoantibody. Testing for anti-RNA polymerase III ab is negative. There are no findings on exam or by history to suggest early progressive systemic sclerosis (PSS). Other testing, such as PFTs, are not being considered at this time in absence of any pulmonary symptoms. This will be revisited in future should symptoms change. Patient's queries fully answered; education verbally completed with regard to this issue. In  particular, I explained that patient could be in a pre-clinical phase of an autoimmune disorder and it is not possible at this point to predict whether or not she will ever develop an active autoimmune disorder. Patient encouraged to return to office should new symptoms arise. She agrees.  Return to office for follow-up in 6 mos.    Yadiel Hughes MD

## 2024-04-12 ENCOUNTER — OFFICE VISIT (OUTPATIENT)
Dept: PRIMARY CARE | Facility: CLINIC | Age: 64
End: 2024-04-12
Payer: COMMERCIAL

## 2024-04-12 VITALS
BODY MASS INDEX: 28.79 KG/M2 | TEMPERATURE: 97.2 F | SYSTOLIC BLOOD PRESSURE: 130 MMHG | HEART RATE: 76 BPM | OXYGEN SATURATION: 98 % | WEIGHT: 173 LBS | DIASTOLIC BLOOD PRESSURE: 74 MMHG

## 2024-04-12 DIAGNOSIS — C50.912 MALIGNANT NEOPLASM OF LEFT FEMALE BREAST, UNSPECIFIED ESTROGEN RECEPTOR STATUS, UNSPECIFIED SITE OF BREAST (MULTI): ICD-10-CM

## 2024-04-12 DIAGNOSIS — F41.9 ANXIETY DISORDER, UNSPECIFIED: ICD-10-CM

## 2024-04-12 DIAGNOSIS — D84.1 DEFECTS IN THE COMPLEMENT SYSTEM (MULTI): ICD-10-CM

## 2024-04-12 DIAGNOSIS — Z13.6 SCREENING FOR CARDIOVASCULAR CONDITION: Primary | ICD-10-CM

## 2024-04-12 PROCEDURE — 3078F DIAST BP <80 MM HG: CPT | Performed by: INTERNAL MEDICINE

## 2024-04-12 PROCEDURE — 3075F SYST BP GE 130 - 139MM HG: CPT | Performed by: INTERNAL MEDICINE

## 2024-04-12 PROCEDURE — 99213 OFFICE O/P EST LOW 20 MIN: CPT | Performed by: INTERNAL MEDICINE

## 2024-04-12 RX ORDER — CLONAZEPAM 1 MG/1
1 TABLET ORAL DAILY PRN
Qty: 30 TABLET | Refills: 2 | Status: CANCELLED | OUTPATIENT
Start: 2024-04-12

## 2024-04-12 ASSESSMENT — ENCOUNTER SYMPTOMS
NECK PAIN: 1
ADENOPATHY: 0
WHEEZING: 0
BACK PAIN: 1
SHORTNESS OF BREATH: 0
CONSTIPATION: 1
PALPITATIONS: 0
ABDOMINAL DISTENTION: 0
DIZZINESS: 0
UNEXPECTED WEIGHT CHANGE: 0
DIARRHEA: 0
NERVOUS/ANXIOUS: 1
NUMBNESS: 1
FATIGUE: 1
DYSURIA: 0
BLOOD IN STOOL: 0
COUGH: 0
DEPRESSION: 0
CONFUSION: 0
LOSS OF SENSATION IN FEET: 0
SORE THROAT: 0
NECK STIFFNESS: 1
WEAKNESS: 0
SINUS PAIN: 0
OCCASIONAL FEELINGS OF UNSTEADINESS: 0

## 2024-04-12 ASSESSMENT — PATIENT HEALTH QUESTIONNAIRE - PHQ9
1. LITTLE INTEREST OR PLEASURE IN DOING THINGS: NOT AT ALL
2. FEELING DOWN, DEPRESSED OR HOPELESS: NOT AT ALL
SUM OF ALL RESPONSES TO PHQ9 QUESTIONS 1 AND 2: 0

## 2024-04-12 ASSESSMENT — PAIN SCALES - GENERAL: PAINLEVEL: 6

## 2024-04-12 NOTE — PROGRESS NOTES
Subjective   Patient ID: Soni Ochoa is a 63 y.o. female who presents for Follow-up (3mon follow up).      HPI     H/O Insomnia- stated that trazodone did not help. She was on clonazepam for many years which helped her with sleep and anxiety. Taking Clonazepam, at night helps. Stated that she stopped Arimidex, and her oncologist is aware. Complaining of abdominal bloating since last few years. Has seen Gastroenterologist and work up was negative. History of chronic constipation-. Taking Colace, senna and MiraLax with no relief .    H/O Chronic low back pain- seeing Dr Santiago. H/O RA- Was started on Enbrel, she was on Humira before.     H/O Osteoporosis- Last bone density scan was in 2020, stated Prolia caused stomach upset.     History of vitamin-D deficiency-unable to tolerate vitamin-D tablets, advised to use droplets over-the-counter.     Concerned about weight gain, wants to lose weight. Her weight loss goal is 40 pounds. Stated she lost weight with weight watchers previously. Her activity is limited to back pain.     Discussed weight loss medication options, due to constipation,  In the interim started on metformin,  she stopped it after few weeks as it did not help with weight loss      Saw Dr Hughes in September 2023, she had labs. Was told that she has Fibromyalgia.   Review of Systems   Constitutional:  Positive for fatigue. Negative for unexpected weight change.   HENT:  Negative for congestion, ear pain, sinus pain and sore throat.    Respiratory:  Negative for cough, shortness of breath and wheezing.    Cardiovascular:  Negative for chest pain, palpitations and leg swelling.   Gastrointestinal:  Positive for constipation. Negative for abdominal distention, blood in stool and diarrhea.   Genitourinary:  Negative for dysuria, pelvic pain and vaginal bleeding.   Musculoskeletal:  Positive for back pain, neck pain and neck stiffness.   Neurological:  Positive for numbness. Negative for dizziness,  syncope and weakness.   Hematological:  Negative for adenopathy.   Psychiatric/Behavioral:  Negative for behavioral problems, confusion and suicidal ideas. The patient is nervous/anxious.        Objective   /74 (BP Location: Right arm, Patient Position: Sitting, BP Cuff Size: Adult)   Pulse 76   Temp 36.2 °C (97.2 °F) (Temporal)   Wt 78.5 kg (173 lb)   SpO2 98%   BMI 28.79 kg/m²     Physical Exam  Constitutional:       General: She is not in acute distress.  HENT:      Head: Normocephalic and atraumatic.   Eyes:      Extraocular Movements: Extraocular movements intact.      Pupils: Pupils are equal, round, and reactive to light.   Cardiovascular:      Rate and Rhythm: Normal rate and regular rhythm.   Pulmonary:      Effort: Pulmonary effort is normal.      Breath sounds: Normal breath sounds. No wheezing or rales.   Abdominal:      General: Bowel sounds are normal.      Palpations: Abdomen is soft. There is no mass.      Tenderness: There is no guarding.      Hernia: No hernia is present.   Musculoskeletal:      Right lower leg: No edema.      Left lower leg: No edema.   Skin:     General: Skin is warm and dry.   Neurological:      General: No focal deficit present.      Mental Status: She is alert.      Cranial Nerves: No cranial nerve deficit.   Psychiatric:         Mood and Affect: Mood normal.         Assessment/Plan       Soni was seen today for follow-up.  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)  Malignant neoplasm of left female breast, unspecified estrogen receptor status, unspecified site of breast (Multi)             Current Outpatient Medications   Medication Instructions    betamethasone dipropionate (Diprolene) 0.05 % ointment Topical, Daily, apply 1 application externally once daily<BR>     betamethasone, augmented, (Diprolene, augmented,) 0.05 % ointment Topical, 2 times daily    celecoxib (CELEBREX) 200 mg, oral, 2 times daily with meals    clonazePAM (KLONOPIN) 1 mg, oral, Daily PRN    docusate sodium (COLACE) 100 mg, oral, 2 times daily    DULoxetine (CYMBALTA) 60 mg, oral, Daily    lactulose (Constulose) 20 gram/30 mL oral solution Take 3 TSP orally every 12 hours as needed    omeprazole (PRILOSEC) 20 mg, oral, Daily    sulfaSALAzine (AZULFIDINE) 1,000 mg, oral, 2 times daily, Do not crush, chew, or split.    tiZANidine (ZANAFLEX) 4 mg, oral    traZODone (DESYREL) 150 mg, oral, Nightly    triamterene-hydrochlorothiazid (Maxzide-25) 37.5-25 mg tablet 1 tablet, oral, Daily before breakfast

## 2024-04-15 RX ORDER — CLONAZEPAM 1 MG/1
1 TABLET ORAL DAILY PRN
Qty: 30 TABLET | Refills: 2 | Status: SHIPPED | OUTPATIENT
Start: 2024-04-15

## 2024-04-16 DIAGNOSIS — M54.16 LUMBAR RADICULOPATHY: ICD-10-CM

## 2024-04-17 RX ORDER — DULOXETIN HYDROCHLORIDE 60 MG/1
60 CAPSULE, DELAYED RELEASE ORAL DAILY
Qty: 30 CAPSULE | Refills: 2 | Status: SHIPPED | OUTPATIENT
Start: 2024-04-17 | End: 2024-07-16

## 2024-05-01 ENCOUNTER — OFFICE VISIT (OUTPATIENT)
Dept: PAIN MEDICINE | Facility: CLINIC | Age: 64
End: 2024-05-01
Payer: COMMERCIAL

## 2024-05-01 VITALS
RESPIRATION RATE: 22 BRPM | DIASTOLIC BLOOD PRESSURE: 68 MMHG | BODY MASS INDEX: 28.82 KG/M2 | HEIGHT: 65 IN | OXYGEN SATURATION: 98 % | SYSTOLIC BLOOD PRESSURE: 124 MMHG | HEART RATE: 74 BPM | WEIGHT: 173 LBS

## 2024-05-01 DIAGNOSIS — G89.29 BILATERAL CHRONIC KNEE PAIN: Primary | ICD-10-CM

## 2024-05-01 DIAGNOSIS — M25.562 BILATERAL CHRONIC KNEE PAIN: Primary | ICD-10-CM

## 2024-05-01 DIAGNOSIS — M25.561 BILATERAL CHRONIC KNEE PAIN: Primary | ICD-10-CM

## 2024-05-01 DIAGNOSIS — M54.16 LUMBAR RADICULOPATHY: ICD-10-CM

## 2024-05-01 PROCEDURE — 4004F PT TOBACCO SCREEN RCVD TLK: CPT | Performed by: ANESTHESIOLOGY

## 2024-05-01 PROCEDURE — 99214 OFFICE O/P EST MOD 30 MIN: CPT | Performed by: ANESTHESIOLOGY

## 2024-05-01 PROCEDURE — 3074F SYST BP LT 130 MM HG: CPT | Performed by: ANESTHESIOLOGY

## 2024-05-01 PROCEDURE — 3078F DIAST BP <80 MM HG: CPT | Performed by: ANESTHESIOLOGY

## 2024-05-01 ASSESSMENT — ENCOUNTER SYMPTOMS
ACTIVITY CHANGE: 1
BACK PAIN: 1
ARTHRALGIAS: 1

## 2024-05-01 ASSESSMENT — PAIN SCALES - GENERAL
PAINLEVEL: 8
PAINLEVEL_OUTOF10: 8

## 2024-05-01 ASSESSMENT — PAIN DESCRIPTION - DESCRIPTORS: DESCRIPTORS: ACHING;DULL

## 2024-05-01 ASSESSMENT — PAIN - FUNCTIONAL ASSESSMENT: PAIN_FUNCTIONAL_ASSESSMENT: 0-10

## 2024-05-01 NOTE — PROGRESS NOTES
The patient is a 63-year-old female with low back and left leg pain.  The pain radiates down the posterior and lateral aspect of the left leg to the ankle.  The pain is worse with activity.  She gets some relief with rest.  The patient benefits immensely from the occasional transforaminal epidural steroid injection on the left at the L4 and L5 levels.  The patient is pleased with her response to treatment.  The patient reports at least 75% relief for 3 months before the pain returns.  The patient would like to schedule her next injection as the pain is starting to return.  Incidentally, the patient is also reporting left knee pain.  The pain is worse when she is standing and walking.  The pain is also worse when she initiates movement.  The patient has been offered intra-articular knee injections in the past but the patient has declined.  The pain in the knee is worsening to the point that the patient would like to go forward with the injection.  The patient takes tramadol very sparingly.  She has taking 8 pills this year.  She gets relief however she does experience constipation.    Review of Systems   Constitutional:  Positive for activity change.   Musculoskeletal:  Positive for arthralgias, back pain and gait problem.   All other systems reviewed and are negative.    GENERAL: alert and appropriate, in no distress, well-hydrated, well-nourished and interactive  SKIN: no rash noted  RESPIRATORY: breathing non-labored and no grunting/flaring/retractions  CHEST: equal chest rise with normal respiratory effort  ABDOMEN: soft and non-tender  BACK: back normal in appearance, spine with reduced ROM  EXTREMITIES: strength intact left knee range of motion reduced and painful,   NEUROLOGIC: gait antalgic, SLR negative, sensation grossly intact.    Assessment and Plan    -Chronicity--chronic musculoskeletal pain    -Diagnostics--no new imaging ordered    -Pharmacologic--no change    -Psychologic--no need for psychologic  intervention from my standpoint    -Physical--we discussed the importance of physical therapy and exercise.  We discussed avoidance and modification techniques.    -Intervention--the patient is a candidate for a transforaminal epidural steroid injection on the left at the L4 and L5 levels.  I explained the risks benefits and alternatives of the procedure to the patient.  The patient wishes to proceed..  The patient is also a candidate for an intra-articular knee injection on the left.  I explained the risks benefits and alternatives of the procedure to the patient.  The patient wishes to proceed.    I spent time educating the patient on the condition including the treatment and the prognosis.  I invited the patient to call at anytime with any questions.

## 2024-05-02 ENCOUNTER — APPOINTMENT (OUTPATIENT)
Dept: OBSTETRICS AND GYNECOLOGY | Facility: CLINIC | Age: 64
End: 2024-05-02
Payer: COMMERCIAL

## 2024-05-10 ENCOUNTER — TELEPHONE (OUTPATIENT)
Dept: PAIN MEDICINE | Facility: CLINIC | Age: 64
End: 2024-05-10
Payer: COMMERCIAL

## 2024-05-24 ENCOUNTER — OFFICE VISIT (OUTPATIENT)
Dept: PAIN MEDICINE | Facility: CLINIC | Age: 64
End: 2024-05-24
Payer: COMMERCIAL

## 2024-05-24 VITALS
OXYGEN SATURATION: 98 % | HEART RATE: 83 BPM | DIASTOLIC BLOOD PRESSURE: 76 MMHG | SYSTOLIC BLOOD PRESSURE: 130 MMHG | RESPIRATION RATE: 22 BRPM

## 2024-05-24 DIAGNOSIS — G89.29 CHRONIC PAIN OF LEFT KNEE: Primary | ICD-10-CM

## 2024-05-24 DIAGNOSIS — M25.562 CHRONIC PAIN OF LEFT KNEE: Primary | ICD-10-CM

## 2024-05-24 PROCEDURE — 20610 DRAIN/INJ JOINT/BURSA W/O US: CPT | Performed by: ANESTHESIOLOGY

## 2024-05-24 PROCEDURE — 3075F SYST BP GE 130 - 139MM HG: CPT | Performed by: ANESTHESIOLOGY

## 2024-05-24 PROCEDURE — 2500000004 HC RX 250 GENERAL PHARMACY W/ HCPCS (ALT 636 FOR OP/ED): Performed by: ANESTHESIOLOGY

## 2024-05-24 PROCEDURE — 4004F PT TOBACCO SCREEN RCVD TLK: CPT | Performed by: ANESTHESIOLOGY

## 2024-05-24 PROCEDURE — 96372 THER/PROPH/DIAG INJ SC/IM: CPT | Mod: 59 | Performed by: ANESTHESIOLOGY

## 2024-05-24 PROCEDURE — 2500000005 HC RX 250 GENERAL PHARMACY W/O HCPCS: Performed by: ANESTHESIOLOGY

## 2024-05-24 PROCEDURE — 3078F DIAST BP <80 MM HG: CPT | Performed by: ANESTHESIOLOGY

## 2024-05-24 RX ORDER — TRIAMCINOLONE ACETONIDE 40 MG/ML
40 INJECTION, SUSPENSION INTRA-ARTICULAR; INTRAMUSCULAR ONCE
Status: COMPLETED | OUTPATIENT
Start: 2024-05-24 | End: 2024-05-24

## 2024-05-24 RX ORDER — LIDOCAINE HYDROCHLORIDE 10 MG/ML
4 INJECTION, SOLUTION EPIDURAL; INFILTRATION; INTRACAUDAL; PERINEURAL ONCE
Status: COMPLETED | OUTPATIENT
Start: 2024-05-24 | End: 2024-05-24

## 2024-05-24 RX ADMIN — LIDOCAINE HYDROCHLORIDE 40 MG: 10 INJECTION, SOLUTION EPIDURAL; INFILTRATION; INTRACAUDAL; PERINEURAL at 10:37

## 2024-05-24 RX ADMIN — TRIAMCINOLONE ACETONIDE 40 MG: 40 INJECTION, SUSPENSION INTRA-ARTICULAR; INTRAMUSCULAR at 10:38

## 2024-05-24 ASSESSMENT — PAIN DESCRIPTION - DESCRIPTORS: DESCRIPTORS: SHARP

## 2024-05-24 ASSESSMENT — PAIN SCALES - GENERAL
PAINLEVEL: 9
PAINLEVEL_OUTOF10: 9

## 2024-05-24 ASSESSMENT — PATIENT HEALTH QUESTIONNAIRE - PHQ9
2. FEELING DOWN, DEPRESSED OR HOPELESS: NOT AT ALL
1. LITTLE INTEREST OR PLEASURE IN DOING THINGS: NOT AT ALL
SUM OF ALL RESPONSES TO PHQ9 QUESTIONS 1 & 2: 0

## 2024-05-24 ASSESSMENT — PAIN - FUNCTIONAL ASSESSMENT: PAIN_FUNCTIONAL_ASSESSMENT: 0-10

## 2024-05-24 NOTE — PROGRESS NOTES
The patient was placed in the seated position.  Her skin was prepped with isopropyl alcohol.  A 25-gauge spinal needle was used to inject 40 mg of triamcinolone and 3 mL of 1% lidocaine into the intra-articular aspect of the left knee.  Needle was removed.  There were no complications.  The patient tolerated the procedure well.    The preprocedure VAS was 9.  The postprocedure VAS was 2

## 2024-05-28 ENCOUNTER — HOSPITAL ENCOUNTER (OUTPATIENT)
Dept: RADIOLOGY | Facility: HOSPITAL | Age: 64
Discharge: HOME | End: 2024-05-28
Payer: COMMERCIAL

## 2024-05-28 ENCOUNTER — HOSPITAL ENCOUNTER (OUTPATIENT)
Dept: GASTROENTEROLOGY | Facility: HOSPITAL | Age: 64
Discharge: HOME | End: 2024-05-28
Payer: COMMERCIAL

## 2024-05-28 VITALS
TEMPERATURE: 96.8 F | RESPIRATION RATE: 20 BRPM | OXYGEN SATURATION: 97 % | BODY MASS INDEX: 28.32 KG/M2 | HEART RATE: 79 BPM | DIASTOLIC BLOOD PRESSURE: 93 MMHG | SYSTOLIC BLOOD PRESSURE: 150 MMHG | WEIGHT: 170 LBS | HEIGHT: 65 IN

## 2024-05-28 DIAGNOSIS — M54.16 LUMBAR RADICULOPATHY: ICD-10-CM

## 2024-05-28 PROCEDURE — 64483 NJX AA&/STRD TFRM EPI L/S 1: CPT | Performed by: ANESTHESIOLOGY

## 2024-05-28 PROCEDURE — 7100000009 HC PHASE TWO TIME - INITIAL BASE CHARGE

## 2024-05-28 PROCEDURE — 64484 NJX AA&/STRD TFRM EPI L/S EA: CPT | Performed by: ANESTHESIOLOGY

## 2024-05-28 PROCEDURE — 2500000005 HC RX 250 GENERAL PHARMACY W/O HCPCS: Performed by: ANESTHESIOLOGY

## 2024-05-28 PROCEDURE — 2550000001 HC RX 255 CONTRASTS: Performed by: ANESTHESIOLOGY

## 2024-05-28 PROCEDURE — 2500000004 HC RX 250 GENERAL PHARMACY W/ HCPCS (ALT 636 FOR OP/ED): Performed by: ANESTHESIOLOGY

## 2024-05-28 PROCEDURE — 7100000010 HC PHASE TWO TIME - EACH INCREMENTAL 1 MINUTE

## 2024-05-28 RX ORDER — TRIAMCINOLONE ACETONIDE 40 MG/ML
INJECTION, SUSPENSION INTRA-ARTICULAR; INTRAMUSCULAR AS NEEDED
Status: COMPLETED | OUTPATIENT
Start: 2024-05-28 | End: 2024-05-28

## 2024-05-28 RX ORDER — LIDOCAINE HYDROCHLORIDE 5 MG/ML
INJECTION, SOLUTION INFILTRATION; PERINEURAL AS NEEDED
Status: COMPLETED | OUTPATIENT
Start: 2024-05-28 | End: 2024-05-28

## 2024-05-28 RX ADMIN — IOHEXOL 1 ML: 240 INJECTION, SOLUTION INTRATHECAL; INTRAVASCULAR; INTRAVENOUS; ORAL at 08:08

## 2024-05-28 RX ADMIN — TRIAMCINOLONE ACETONIDE 60 MG: 40 INJECTION, SUSPENSION INTRA-ARTICULAR; INTRAMUSCULAR at 08:09

## 2024-05-28 RX ADMIN — LIDOCAINE HYDROCHLORIDE 12 ML: 5 INJECTION, SOLUTION INFILTRATION; PERINEURAL at 08:08

## 2024-05-28 ASSESSMENT — PAIN DESCRIPTION - DESCRIPTORS: DESCRIPTORS: STABBING;SPASM

## 2024-05-28 ASSESSMENT — COLUMBIA-SUICIDE SEVERITY RATING SCALE - C-SSRS
6. HAVE YOU EVER DONE ANYTHING, STARTED TO DO ANYTHING, OR PREPARED TO DO ANYTHING TO END YOUR LIFE?: NO
1. IN THE PAST MONTH, HAVE YOU WISHED YOU WERE DEAD OR WISHED YOU COULD GO TO SLEEP AND NOT WAKE UP?: NO
2. HAVE YOU ACTUALLY HAD ANY THOUGHTS OF KILLING YOURSELF?: NO

## 2024-05-28 ASSESSMENT — PAIN - FUNCTIONAL ASSESSMENT
PAIN_FUNCTIONAL_ASSESSMENT: 0-10
PAIN_FUNCTIONAL_ASSESSMENT: 0-10

## 2024-05-28 ASSESSMENT — PAIN SCALES - GENERAL
PAINLEVEL_OUTOF10: 10 - WORST POSSIBLE PAIN
PAINLEVEL_OUTOF10: 10 - WORST POSSIBLE PAIN

## 2024-05-28 NOTE — DISCHARGE INSTRUCTIONS
You underwent a procedure today    After most procedures, it is recommended that you relax and limit your activity for the remainder of the day unless you have been told otherwise by your pain physician.  You should not drive a car, operate machinery, or make important legal decisions unless otherwise directed by your pain physician.  You may resume your normal activity, including exercise, tomorrow.      Keep a written pain diary of how much pain relief you experienced following the procedure and the length of time of pain relief you experienced pain relief. Following diagnostic injections like medial branch nerve blocks, sacroiliac joint blocks, stellate ganglion injections and other blocks, it is very important you record the specific amount of pain relief you experienced immediately after the injectionand how long it lasted. Your doctor will ask you for this information at your follow up visit.     For all injections, please keep the injection site dry and inspect the site for a couple of days. You may remove the Band-Aid the day of the injection at any time.     Some discomfort, bruising or slight swelling may occur at the injection site. This is not abnormal if it occurs.  If needed you may:    -Take over the counter medication such as Tylenol or Motrin.   -Apply an ice pack for 30 minutes, 2 to 3 times a day for the first 24 hours.     You may shower today; no soaking baths, hot tubs, whirlpools or swimming pools for two days.      If you are given steroids in your injection, it may take 3-5 days for the steroid medication to take effect. You may notice a worsening of your symptoms for 1-2 days after the injection. This is not abnormal.  You may use acetaminophen, ibuprofen, or prescription medication that your doctor may have prescribed for you if you need to do so.     A few common side effects of steroids include facial flushing, sweating, restlessness, irritability,difficulty sleeping, increase in blood  sugar, and increased blood pressure. If you have diabetes, please monitor your blood sugar at least once a day for at least 5 days. If you have poorly controlled high blood pressure, monitoryour blood pressure for at least 2 days and contact your primary care physician if these numbers are unusually high for you.      If you take aspirin or non-steroidal anti-inflammatory drugs (examples are Motrin, Advil, ibuprofen, Naprosyn, Voltaren, Relafen, etc.) you may restart these this evening.    You do not need to discontinue non-aspirin-containing pain medications prior to an injection (examples: Celebrex, tramadol, hydrocodone and acetaminophen).      If you take a blood thinning medication (Coumadin, Lovenox, Fragmin,Ticlid, Plavix, Pradaxa, etc.), please discuss this with your primary care physician/cardiologist and your pain physician. These medications MUST be discontinued before you can have an injection safely, without the risk of uncontrolled bleeding. If these medications are not discontinued for an appropriate period of time, you will not be able to receivean injection.      If you are taking Coumadin, please have your INR checked the morning of your procedure and bringthe result to your appointment unless otherwise instructed. If your INR is over 1.2, your injection may need to be rescheduled to avoid uncontrolled bleeding from the needle placement.     Call [facility] at [phone] between 8am-4pm Monday - Friday if you are experiencing the following:    If you received an epidural or spinal injection:    -Headache that doesnot go away with medicine, is worse when sitting or standing up, and is greatly relieved upon lying down.   -Severe pain worse than or different than your baseline pain.   -Chills or fever (101º F or greater).   -Drainage or signs of infection at the injection site     Go directly to the Emergency Department if you are experiencing the following and received an  epidural or spinal injection:   -Abrupt weakness or progressive weakness in your legs that starts after you leave the clinic.   -Abrupt severe or worsening numbness in your legs.   -Inability to urinate after the injection or loss of bowel or bladder control without the urge to defecate or urinate.     If you have a clinical question that cannot wait until your next appointment, please call [phone] between 8am-4pm Monday - Friday or send a [facility] message. We do our best to return all non-emergency messages within 24 hours, Monday - Friday. A nurse or physician will return your message.      If you need to cancel an appointment, please call the scheduling staff at [phone] during normal business hours or leave a message at least 24 hours in advance.     If you are going to be sedated for your next procedure, you MUST have responsible adult who can legally drive accompany you home. You cannot eat or drink for eight hours prior to the planned procedure if you are going to receive sedation. You may take your non-blood thinning medications with a small sip of water.

## 2024-05-28 NOTE — OP NOTE
* No procedures listed * Operative Note     Date: 2024  OR Location: [facility] Endosc1    Name: Soni Ochoa, : 1960, Age: 63 y.o., MRN: [id number], Sex: female    Diagnosis  * No Diagnosis Codes entered * * No Diagnosis Codes entered *     Procedures  * No procedures documented on diagnosis form *    Surgeons   * No surgeons found in log *    Resident/Fellow/Other Assistant:  * No surgeons found in log *    Procedure Summary  Anesthesia: * No anesthesia type entered *  ASA: ASA status not filed in the log.  Anesthesia Staff: No anesthesia staff entered.  Estimated Blood Loss: mL  Intra-op Medications: * Intraprocedure medication information is unavailable because the case start and end events have not been set *      Intraprocedure I/O Totals       None           Specimen: No specimens collected     Staff:   Circulator: Raysa         Drains and/or Catheters: * None in log *    Tourniquet Times:         Implants:     Findings:     Indications: Soni Ochoa is an 63 y.o. female who is having surgery for * No pre-op diagnosis entered *.     The patient was seen in the preoperative area. The risks, benefits, complications, treatment options, non-operative alternatives, expected recovery and outcomes were discussed with the patient. The possibilities of reaction to medication, pulmonary aspiration, injury to surrounding structures, bleeding, recurrent infection, the need for additional procedures, failure to diagnose a condition, and creating a complication requiring transfusion or operation were discussed with the patient. The patient concurred with the proposed plan, giving informed consent.  The site of surgery was properly noted/marked if necessary per policy. The patient has been actively warmed in preoperative area. Preoperative antibiotics are not indicated. Venous thrombosis prophylaxis are not indicated.    Procedure Details: The patient was brought to the procedure room and placed in  prone position.  Sterile prep and drape with ChloraPrep and a fenestrated drape.  10 mL of half percent lidocaine were injected through a 25-gauge spinal needle for local anesthesia.  22-gauge pencil point needles were guided to the L4 and L5 foramen on the left under fluoroscopic guidance.  Contrast was injected under live fluoroscopy to ensure proper needle placement.  Then 60 mg of triamcinolone and 2 mL of half percent lidocaine were injected through the needles in divided doses.  The needles were removed and the patient was transferred to recovery.   Complications:  None; patient tolerated the procedure well.    Disposition: PACU - hemodynamically stable.  Condition: stable         Additional Details:     Attending Attestation: I performed the procedure.    *No primary surgeon found*

## 2024-07-08 ENCOUNTER — PREP FOR PROCEDURE (OUTPATIENT)
Dept: PAIN MEDICINE | Facility: CLINIC | Age: 64
End: 2024-07-08
Payer: COMMERCIAL

## 2024-07-08 ENCOUNTER — OFFICE VISIT (OUTPATIENT)
Dept: PAIN MEDICINE | Facility: CLINIC | Age: 64
End: 2024-07-08
Payer: COMMERCIAL

## 2024-07-08 VITALS
SYSTOLIC BLOOD PRESSURE: 154 MMHG | RESPIRATION RATE: 22 BRPM | HEIGHT: 65 IN | DIASTOLIC BLOOD PRESSURE: 70 MMHG | BODY MASS INDEX: 28.32 KG/M2 | OXYGEN SATURATION: 98 % | WEIGHT: 170 LBS | HEART RATE: 81 BPM

## 2024-07-08 DIAGNOSIS — K59.00 CONSTIPATION, UNSPECIFIED CONSTIPATION TYPE: Primary | ICD-10-CM

## 2024-07-08 DIAGNOSIS — M54.16 LUMBAR RADICULOPATHY: ICD-10-CM

## 2024-07-08 DIAGNOSIS — K21.9 GASTRO-ESOPHAGEAL REFLUX DISEASE WITHOUT ESOPHAGITIS: ICD-10-CM

## 2024-07-08 DIAGNOSIS — F51.01 PRIMARY INSOMNIA: ICD-10-CM

## 2024-07-08 DIAGNOSIS — I10 ESSENTIAL HYPERTENSION: ICD-10-CM

## 2024-07-08 DIAGNOSIS — M54.16 LUMBAR RADICULOPATHY: Primary | ICD-10-CM

## 2024-07-08 PROCEDURE — 99214 OFFICE O/P EST MOD 30 MIN: CPT | Performed by: ANESTHESIOLOGY

## 2024-07-08 PROCEDURE — 3078F DIAST BP <80 MM HG: CPT | Performed by: ANESTHESIOLOGY

## 2024-07-08 PROCEDURE — 4004F PT TOBACCO SCREEN RCVD TLK: CPT | Performed by: ANESTHESIOLOGY

## 2024-07-08 PROCEDURE — 3077F SYST BP >= 140 MM HG: CPT | Performed by: ANESTHESIOLOGY

## 2024-07-08 RX ORDER — DULOXETIN HYDROCHLORIDE 60 MG/1
60 CAPSULE, DELAYED RELEASE ORAL DAILY
Qty: 30 CAPSULE | Refills: 2 | Status: SHIPPED | OUTPATIENT
Start: 2024-07-08 | End: 2024-07-08 | Stop reason: SDUPTHER

## 2024-07-08 RX ORDER — TRAZODONE HYDROCHLORIDE 50 MG/1
150 TABLET ORAL NIGHTLY
Qty: 270 TABLET | Refills: 1 | Status: SHIPPED | OUTPATIENT
Start: 2024-07-08

## 2024-07-08 RX ORDER — DULOXETIN HYDROCHLORIDE 60 MG/1
60 CAPSULE, DELAYED RELEASE ORAL DAILY
Qty: 30 CAPSULE | Refills: 2 | Status: SHIPPED | OUTPATIENT
Start: 2024-07-08 | End: 2024-10-06

## 2024-07-08 RX ORDER — CELECOXIB 200 MG/1
200 CAPSULE ORAL
Qty: 60 CAPSULE | Refills: 2 | Status: SHIPPED | OUTPATIENT
Start: 2024-07-08

## 2024-07-08 RX ORDER — OMEPRAZOLE 20 MG/1
20 CAPSULE, DELAYED RELEASE ORAL DAILY
Qty: 90 CAPSULE | Refills: 1 | Status: SHIPPED | OUTPATIENT
Start: 2024-07-08

## 2024-07-08 RX ORDER — TRIAMTERENE/HYDROCHLOROTHIAZID 37.5-25 MG
1 TABLET ORAL
Qty: 90 TABLET | Refills: 0 | Status: SHIPPED | OUTPATIENT
Start: 2024-07-08

## 2024-07-08 RX ORDER — DOCUSATE SODIUM 100 MG/1
100 CAPSULE, LIQUID FILLED ORAL 2 TIMES DAILY PRN
Qty: 180 CAPSULE | Refills: 1 | Status: SHIPPED | OUTPATIENT
Start: 2024-07-08

## 2024-07-08 RX ORDER — CELECOXIB 200 MG/1
200 CAPSULE ORAL
Qty: 60 CAPSULE | Refills: 2 | Status: SHIPPED | OUTPATIENT
Start: 2024-07-08 | End: 2024-07-08 | Stop reason: SDUPTHER

## 2024-07-08 ASSESSMENT — ENCOUNTER SYMPTOMS
BACK PAIN: 1
ACTIVITY CHANGE: 1

## 2024-07-08 ASSESSMENT — PAIN SCALES - GENERAL
PAINLEVEL: 6
PAINLEVEL_OUTOF10: 6

## 2024-07-08 ASSESSMENT — PAIN - FUNCTIONAL ASSESSMENT: PAIN_FUNCTIONAL_ASSESSMENT: 0-10

## 2024-07-08 ASSESSMENT — PAIN DESCRIPTION - DESCRIPTORS: DESCRIPTORS: ACHING

## 2024-07-08 NOTE — PROGRESS NOTES
Tramadol 50 mg.  28.5/40.  Fill date 10/0/2023.  Last taken 7/1/2024.  Patient has newer rx for tramadol that she has not yet taken.

## 2024-07-08 NOTE — PROGRESS NOTES
The patient is a 64-year-old female with low back and bilateral leg pain.  The pain is mostly on the left side.  The patient responds quite well to the occasional transforaminal epidural steroid injection on the left at the L4 and L5 levels.  The patient reports at least 75% relief for over 3 months before the pain worsens.  The patient would like to schedule her next injection.  Her pain affects her quality of life including her ability to be active and her ability to sleep.  The patient has participated in physical therapy.  She continues her physician directed home exercise program daily the patient takes tramadol very sparingly.  She gets some relief with the use of duloxetine and celecoxib.  She does not experience side effects.  Incidentally, the patient is quite pleased with her response to the left knee injection.    Review of Systems   Constitutional:  Positive for activity change.   Musculoskeletal:  Positive for back pain and gait problem.   All other systems reviewed and are negative.    GENERAL: alert and appropriate, in no distress, well-hydrated, well-nourished and interactive  SKIN: no rash noted  RESPIRATORY: breathing non-labored and no grunting/flaring/retractions  CHEST: equal chest rise with normal respiratory effort  ABDOMEN: soft and non-tender  BACK: back normal in appearance, spine with reduced ROM  EXTREMITIES: strength intact  NEUROLOGIC: gait antalgic, SLR negative, sensation grossly intact.    Assessment and Plan    -Chronicity--chronic spinal pain    -Diagnostics--no new imaging ordered    -Pharmacologic--refill duloxetine and celecoxib    -Psychologic--no need for psychologic intervention from my standpoint.  There are no mental health issues of which I am aware that are contributing to the patient's pain.  There are no substance abuse or alcohol abuse issues of which I am aware that are contributing to the patient's pain.    -Physical--we discussed the importance of physical therapy  and exercise.  We discussed avoidance and modification techniques.    -Intervention--the patient is a candidate for a transforaminal epidural steroid injection on the left at the L4 and L5 levels.  I explained the risks benefits and alternatives of the procedure to the patient.  The patient wishes to proceed.    I spent time educating the patient on the condition including the treatment and the prognosis.  I invited the patient to call at anytime with any questions.

## 2024-07-11 ASSESSMENT — ENCOUNTER SYMPTOMS
OCCASIONAL FEELINGS OF UNSTEADINESS: 0
LOSS OF SENSATION IN FEET: 0
DEPRESSION: 0

## 2024-07-11 ASSESSMENT — PATIENT HEALTH QUESTIONNAIRE - PHQ9
2. FEELING DOWN, DEPRESSED OR HOPELESS: NOT AT ALL
SUM OF ALL RESPONSES TO PHQ9 QUESTIONS 1 AND 2: 0
1. LITTLE INTEREST OR PLEASURE IN DOING THINGS: NOT AT ALL

## 2024-07-12 ENCOUNTER — OFFICE VISIT (OUTPATIENT)
Dept: PRIMARY CARE | Facility: CLINIC | Age: 64
End: 2024-07-12
Payer: COMMERCIAL

## 2024-07-12 VITALS
DIASTOLIC BLOOD PRESSURE: 78 MMHG | WEIGHT: 176 LBS | HEART RATE: 78 BPM | SYSTOLIC BLOOD PRESSURE: 144 MMHG | TEMPERATURE: 97.8 F | BODY MASS INDEX: 29.29 KG/M2 | OXYGEN SATURATION: 98 %

## 2024-07-12 DIAGNOSIS — Z02.83 ENCOUNTER FOR DRUG SCREENING: ICD-10-CM

## 2024-07-12 DIAGNOSIS — I10 ESSENTIAL HYPERTENSION: ICD-10-CM

## 2024-07-12 DIAGNOSIS — G47.00 INSOMNIA, UNSPECIFIED TYPE: ICD-10-CM

## 2024-07-12 DIAGNOSIS — M81.0 POST-MENOPAUSAL OSTEOPOROSIS: ICD-10-CM

## 2024-07-12 DIAGNOSIS — Z00.00 ANNUAL PHYSICAL EXAM: Primary | ICD-10-CM

## 2024-07-12 DIAGNOSIS — F41.9 ANXIETY DISORDER, UNSPECIFIED: ICD-10-CM

## 2024-07-12 DIAGNOSIS — E55.9 VITAMIN D DEFICIENCY: ICD-10-CM

## 2024-07-12 DIAGNOSIS — M67.40 GANGLION CYST: ICD-10-CM

## 2024-07-12 PROCEDURE — 99396 PREV VISIT EST AGE 40-64: CPT | Performed by: INTERNAL MEDICINE

## 2024-07-12 PROCEDURE — 3078F DIAST BP <80 MM HG: CPT | Performed by: INTERNAL MEDICINE

## 2024-07-12 PROCEDURE — 3077F SYST BP >= 140 MM HG: CPT | Performed by: INTERNAL MEDICINE

## 2024-07-12 RX ORDER — CLONAZEPAM 1 MG/1
1 TABLET ORAL DAILY PRN
Qty: 30 TABLET | Refills: 0 | Status: SHIPPED | OUTPATIENT
Start: 2024-07-12

## 2024-07-12 ASSESSMENT — ENCOUNTER SYMPTOMS
DYSURIA: 0
CONFUSION: 0
BLOOD IN STOOL: 0
COUGH: 0
UNEXPECTED WEIGHT CHANGE: 0
DIZZINESS: 0
PALPITATIONS: 0
NECK STIFFNESS: 1
ADENOPATHY: 0
NERVOUS/ANXIOUS: 1
WEAKNESS: 0
SINUS PAIN: 0
DIARRHEA: 0
ABDOMINAL DISTENTION: 0
WHEEZING: 0
FATIGUE: 1
BACK PAIN: 1
CONSTIPATION: 1
NECK PAIN: 1
SHORTNESS OF BREATH: 0
NUMBNESS: 1
SORE THROAT: 0

## 2024-07-12 ASSESSMENT — PAIN SCALES - GENERAL: PAINLEVEL: 8

## 2024-07-12 NOTE — PROGRESS NOTES
Subjective   Patient ID: Soni Ochoa is a 64 y.o. female who presents for Annual Exam.      HPI     H/O Insomnia- stated that trazodone did not help. She was on clonazepam for many years which helped her with sleep and anxiety. Taking Clonazepam, at night helps. Stated that she stopped Arimidex, and her oncologist is aware. Complaining of abdominal bloating since last few years. Has seen Gastroenterologist and work up was negative. History of chronic constipation-. Taking Colace, senna and MiraLax with no relief .    H/O Chronic low back pain- seeing Dr Santiago. H/O RA- Was started on Enbrel, she was on Humira before.     H/O Osteoporosis- Last bone density scan was in 2020, stated Prolia caused stomach upset.     History of vitamin-D deficiency-unable to tolerate vitamin-D tablets, advised to use droplets over-the-counter.     Concerned about weight gain, wants to lose weight. Her weight loss goal is 40 pounds. Stated she lost weight with weight watchers previously. Her activity is limited to back pain.     Discussed weight loss medication options, due to constipation,  In the interim started on metformin,  she stopped it after few weeks as it did not help with weight loss    Seeing Dr. Hughes, possible Sjogren's??  Has next appointment in August 2024    Review of Systems   Constitutional:  Positive for fatigue. Negative for unexpected weight change.   HENT:  Negative for congestion, ear pain, sinus pain and sore throat.    Respiratory:  Negative for cough, shortness of breath and wheezing.    Cardiovascular:  Negative for chest pain, palpitations and leg swelling.   Gastrointestinal:  Positive for constipation. Negative for abdominal distention, blood in stool and diarrhea.   Genitourinary:  Negative for dysuria, pelvic pain and vaginal bleeding.   Musculoskeletal:  Positive for back pain, neck pain and neck stiffness.   Neurological:  Positive for numbness. Negative for dizziness, syncope and weakness.    Hematological:  Negative for adenopathy.   Psychiatric/Behavioral:  Negative for behavioral problems, confusion and suicidal ideas. The patient is nervous/anxious.        Objective   /78 (BP Location: Right arm, Patient Position: Sitting, BP Cuff Size: Adult)   Pulse 78   Temp 36.6 °C (97.8 °F) (Temporal)   Wt 79.8 kg (176 lb)   SpO2 98%   BMI 29.29 kg/m²     Physical Exam  Constitutional:       General: She is not in acute distress.  HENT:      Head: Normocephalic and atraumatic.      Right Ear: Tympanic membrane normal.      Left Ear: Tympanic membrane normal.   Eyes:      Extraocular Movements: Extraocular movements intact.      Pupils: Pupils are equal, round, and reactive to light.   Cardiovascular:      Rate and Rhythm: Normal rate and regular rhythm.      Heart sounds: No murmur heard.  Pulmonary:      Effort: Pulmonary effort is normal.      Breath sounds: Normal breath sounds. No wheezing or rales.   Abdominal:      General: Bowel sounds are normal.      Palpations: Abdomen is soft. There is no mass.      Tenderness: There is no guarding.      Hernia: No hernia is present.   Musculoskeletal:      Right lower leg: No edema.      Left lower leg: No edema.   Lymphadenopathy:      Cervical: No cervical adenopathy.   Skin:     General: Skin is warm and dry.      Findings: No rash.      Comments: Ganglion cyst on right wrist   Neurological:      General: No focal deficit present.      Mental Status: She is alert.      Cranial Nerves: No cranial nerve deficit.   Psychiatric:         Mood and Affect: Mood normal.         Assessment/Plan       Soni was seen today for annual exam.  Diagnoses and all orders for this visit:  Annual physical exam (Primary)  Essential hypertension  Insomnia, unspecified type  Anxiety disorder, unspecified  -     clonazePAM (KlonoPIN) 1 mg tablet; Take 1 tablet (1 mg) by mouth once daily as needed for anxiety.  Ganglion cyst  -     Referral to Orthopaedic Surgery;  Future  Encounter for drug screening  -     Drug Screen, Urine With Reflex to Confirmation; Future  Post-menopausal osteoporosis  Vitamin D deficiency               Current Outpatient Medications   Medication Instructions    betamethasone dipropionate (Diprolene) 0.05 % ointment Topical, Daily, apply 1 application externally once daily<BR>    betamethasone, augmented, (Diprolene, augmented,) 0.05 % ointment Topical, 2 times daily    celecoxib (CELEBREX) 200 mg, oral, 2 times daily (morning and late afternoon)    clonazePAM (KLONOPIN) 1 mg, oral, Daily PRN    docusate sodium (COLACE) 100 mg, oral, 2 times daily PRN    DULoxetine (CYMBALTA) 60 mg, oral, Daily    lactulose (Constulose) 20 gram/30 mL oral solution Take 3 TSP orally every 12 hours as needed    omeprazole (PRILOSEC) 20 mg, oral, Daily    sulfaSALAzine (AZULFIDINE) 1,000 mg, oral, 2 times daily, Do not crush, chew, or split.    tiZANidine (ZANAFLEX) 4 mg, oral    traZODone (DESYREL) 150 mg, oral, Nightly    triamterene-hydrochlorothiazid (Maxzide-25) 37.5-25 mg tablet 1 tablet, oral, Daily before breakfast

## 2024-07-15 ENCOUNTER — TELEPHONE (OUTPATIENT)
Dept: PRIMARY CARE | Facility: CLINIC | Age: 64
End: 2024-07-15
Payer: COMMERCIAL

## 2024-08-06 NOTE — PROGRESS NOTES
Patient ID: Soni Ochoa is a 64 y.o. female.    The patient presents to clinic today for her history of breast cancer.    Cancer Staging   No matching staging information was found for the patient.        Diagnostic/Therapeutic History:  February 2018: diagnosed with a left-sided invasive breast cancer. She had an initial biopsy of an abnormality in the left breast by screening mammography which  showed invasive mammary carcinoma, ER 90%, NV greater than 95% and HER-2/sirisha 3+ by immunohistochemistry. She then underwent a lumpectomy with sentinel lymph node biopsy on 03/01/2018 which showed a 7 mm invasive carcinoma grade 2. Three sentinel lymph  nodes were removed, all of which were negative for metastases. She then received adjuvant chemotherapy with weekly Taxol and Herceptin  from April through June 2018 and then shifted to maintenance Herceptin alone for a full year. She completed adjuvant radiation therapy to the breast in July 2018 and began anastrozole  in October 2018. She remained on anastrozole for approximately two years, but eventually stopped that she did not feel that she was recovering quickly enough from her postoperative fatigue and thought that the anastrozole might be partial responsible.  After stopping anastrozole, she was started on letrozole, but at the same time she was put on Arava for an unrelated diagnosis of arthritis.  Once she was on both of those medications, she developed very profuse diarrhea. As a result, both medications were stopped and her diarrhea improved. Anastrozole ultimately stopped after ~2 years.  Tamoxifen was also not tolerated.     History of Present Illness (HPI)/Interval History:  Ms. Ochoa presents for routine FUV, on observation.  No new breast-related symptoms. Some intermittent left sided breast tenderness.   She has some mild headaches, attributes them towards allergies. She used to get allergy shots but not offered any further.   She endorses  "chronic constipation. She has been prescribed Constulose, not helpful.   No dyspnea, rash, abdominal pain, issues with sleep.     Review of Systems:  14-point ROS otherwise negative, as per HPI.    Past Medical History:   Diagnosis Date    Back pain     Breast cancer (Multi)     DDD (degenerative disc disease), cervical     Edema of both legs 08/17/2023    Fibromyalgia, primary     Nerve root pain     Painful diabetic neuropathy (Multi) 08/17/2023    RA (rheumatoid arthritis) (Multi)        Past Surgical History:   Procedure Laterality Date    BI MAMMO GUIDED BREAST LEFT LOCALIZATION Left 03/01/2018    BI MAMMO GUIDED LOCALIZATION BREAST LEFT LAK SURG [facility]    OTHER SURGICAL HISTORY  11/10/2017    Wrist Surgery Left    OTHER SURGICAL HISTORY Bilateral     Essure insert and removal - [place]       Social History     Socioeconomic History    Marital status:      Spouse name: Abundio    Number of children: 0    Highest education level: Associate degree: occupational, technical, or vocational program   Tobacco Use    Smoking status: Every Day     Current packs/day: 0.25     Average packs/day: 0.2 packs/day for 49.6 years (12.4 ttl pk-yrs)     Types: Cigarettes     Start date: 01/1975     Passive exposure: Never    Smokeless tobacco: Never   Vaping Use    Vaping status: Never Used   Substance and Sexual Activity    Alcohol use: Not Currently     Comment: over 3 months ago    Drug use: Never    Sexual activity: Not Currently     Partners: Female     Birth control/protection: Post-menopausal       Allergies   Allergen Reactions    Denosumab Other and Unknown     \"STOMACH ACHE\"     Ketorolac Other and Unknown     \"Stomach ache\"    Nickel Unknown and Swelling         Current Outpatient Medications:     betamethasone dipropionate (Diprolene) 0.05 % ointment, Apply topically once daily. apply 1 application externally once daily, Disp: , Rfl:     betamethasone, augmented, (Diprolene, augmented,) 0.05 % ointment, Apply " topically 2 times a day., Disp: 30 g, Rfl: 3    celecoxib (CeleBREX) 200 mg capsule, Take 1 capsule (200 mg) by mouth 2 times daily (morning and late afternoon)., Disp: 60 capsule, Rfl: 2    clonazePAM (KlonoPIN) 1 mg tablet, Take 1 tablet (1 mg) by mouth once daily as needed for anxiety., Disp: 30 tablet, Rfl: 0    docusate sodium (Colace) 100 mg capsule, TAKE 1 CAPSULE BY MOUTH TWICE DAILY AS NEEDED, Disp: 180 capsule, Rfl: 1    DULoxetine (Cymbalta) 60 mg DR capsule, Take 1 capsule (60 mg) by mouth once daily., Disp: 30 capsule, Rfl: 2    lactulose (Constulose) 20 gram/30 mL oral solution, Take 3 TSP orally every 12 hours as needed, Disp: , Rfl:     omeprazole (PriLOSEC) 20 mg DR capsule, TAKE 1 CAPSULE BY MOUTH ONCE DAILY, Disp: 90 capsule, Rfl: 1    sulfaSALAzine (Azulfidine) 500 mg DR tablet, Take 2 tablets (1,000 mg) by mouth 2 times a day. Do not crush, chew, or split., Disp: 120 tablet, Rfl: 5    tiZANidine (Zanaflex) 4 mg tablet, Take 1 tablet (4 mg) by mouth., Disp: , Rfl:     traZODone (Desyrel) 50 mg tablet, take 3 tablets by mouth nightly, Disp: 270 tablet, Rfl: 1    triamterene-hydrochlorothiazid (Maxzide-25) 37.5-25 mg tablet, take 1 tablet by mouth once daily in the morning, Disp: 90 tablet, Rfl: 0     Objective    BSA: There is no height or weight on file to calculate BSA.  There were no vitals taken for this visit.    Performance Status:  The ECOG performance scale today is ECO- Fully active, able to carry on all pre-disease performance w/o restriction.    Physical Exam  Vitals reviewed.   Constitutional:       General: She is awake. She is not in acute distress.     Appearance: Normal appearance. She is not ill-appearing.   HENT:      Mouth/Throat:      Pharynx: Oropharynx is clear. No oropharyngeal exudate.   Eyes:      General: No scleral icterus.     Conjunctiva/sclera: Conjunctivae normal.   Neck:      Trachea: Trachea and phonation normal. No tracheal tenderness.   Cardiovascular:       Rate and Rhythm: Normal rate and regular rhythm.      Heart sounds: No murmur heard.     No friction rub. No gallop.   Pulmonary:      Effort: Pulmonary effort is normal. No respiratory distress.      Breath sounds: Normal breath sounds. No stridor. No wheezing, rhonchi or rales.   Chest:      Chest wall: No mass, lacerations, deformity or tenderness.   Breasts:     Right: No swelling, mass, nipple discharge, skin change or tenderness.      Left: No swelling, mass, nipple discharge, skin change or tenderness.   Abdominal:      General: Abdomen is flat. There is no distension.      Palpations: Abdomen is soft. There is no mass.      Tenderness: There is no abdominal tenderness.   Lymphadenopathy:      Cervical: No cervical adenopathy.      Upper Body:      Right upper body: No supraclavicular, axillary or pectoral adenopathy.      Left upper body: No supraclavicular, axillary or pectoral adenopathy.   Skin:     General: Skin is warm and dry.      Coloration: Skin is not jaundiced.      Findings: No lesion or rash.   Neurological:      General: No focal deficit present.      Mental Status: She is alert and oriented to person, place, and time.      Motor: No weakness.      Gait: Gait normal.   Psychiatric:         Mood and Affect: Mood normal.         Thought Content: Thought content normal.         Judgment: Judgment normal.         Laboratory Data:  Lab Results   Component Value Date    WBC 15.6 (H) 01/18/2024    HGB 12.6 01/18/2024    HCT 39.6 01/18/2024    MCV 95 01/18/2024     01/18/2024       Chemistry    Lab Results   Component Value Date/Time     03/03/2021 1124    K 3.5 03/03/2021 1124     03/03/2021 1124    CO2 25 03/03/2021 1124    BUN 12 03/03/2021 1124    CREATININE 0.7 03/03/2021 1124    Lab Results   Component Value Date/Time    CALCIUM 9.8 03/03/2021 1124    ALKPHOS 106 03/03/2021 1124    AST 14 03/03/2021 1124    ALT 11 03/03/2021 1124    BILITOT 0.2 03/03/2021 1124              Radiology:  FL fluoro images no charge  These images are not reportable by radiology and will not be interpreted   by  Radiologists.       No results found for this or any previous visit from the past 365 days.          Assessment/Plan:    Soni Ochoa is a 64 y.o. female with a history of left breast cancer, who presents today follow-up evaluation.  #pT1bN0 ER+/HER2+ breast cancer (left).   -S/p lumpectomy/SLNBx 3/1/18.  -TH x9, 1 year herceptin, adjuvant RT 07/2018.  -Anastrazole x 2 years stopped for fatigue, mood changes, arthralgias.  -Letrozole/tamoxifen unable to be tolerated.    - She remains on observation  - Mammogram 7/2023 unremarkable. Continue yearly screening. Ordered today   - Referral placed to GI for chronic constipation     There is no evidence of breast cancer recurrence based on her clinical exam today.     Disposition.  - RTC 1 year with Alyssa Berkowitz PA-C   - She was given our contact information and instructed to call with concerns/questions in the interim.    No orders of the defined types were placed in this encounter.            Alyssa Berkowitz PA-C  Hematology and Medical Oncology  [facility]

## 2024-08-07 ENCOUNTER — OFFICE VISIT (OUTPATIENT)
Dept: HEMATOLOGY/ONCOLOGY | Facility: CLINIC | Age: 64
End: 2024-08-07
Payer: COMMERCIAL

## 2024-08-07 VITALS
WEIGHT: 177.91 LBS | HEART RATE: 82 BPM | OXYGEN SATURATION: 99 % | TEMPERATURE: 97.7 F | DIASTOLIC BLOOD PRESSURE: 82 MMHG | SYSTOLIC BLOOD PRESSURE: 135 MMHG | BODY MASS INDEX: 31.52 KG/M2 | HEIGHT: 63 IN | RESPIRATION RATE: 17 BRPM

## 2024-08-07 DIAGNOSIS — Z12.31 SCREENING MAMMOGRAM FOR HIGH-RISK PATIENT: ICD-10-CM

## 2024-08-07 DIAGNOSIS — K59.09 CHRONIC CONSTIPATION: ICD-10-CM

## 2024-08-07 DIAGNOSIS — Z85.3 HISTORY OF LEFT BREAST CANCER: Primary | ICD-10-CM

## 2024-08-07 DIAGNOSIS — M06.4 INFLAMMATORY POLYARTHRITIS (MULTI): ICD-10-CM

## 2024-08-07 PROCEDURE — 3075F SYST BP GE 130 - 139MM HG: CPT

## 2024-08-07 PROCEDURE — 3008F BODY MASS INDEX DOCD: CPT

## 2024-08-07 PROCEDURE — 3079F DIAST BP 80-89 MM HG: CPT

## 2024-08-07 PROCEDURE — 99214 OFFICE O/P EST MOD 30 MIN: CPT

## 2024-08-07 ASSESSMENT — PAIN SCALES - GENERAL: PAINLEVEL: 0-NO PAIN

## 2024-08-09 DIAGNOSIS — M06.4 INFLAMMATORY POLYARTHRITIS (MULTI): ICD-10-CM

## 2024-08-09 RX ORDER — SULFASALAZINE 500 MG/1
1000 TABLET, DELAYED RELEASE ORAL 2 TIMES DAILY
Qty: 120 TABLET | Refills: 5 | Status: SHIPPED | OUTPATIENT
Start: 2024-08-09 | End: 2025-02-05

## 2024-08-12 ASSESSMENT — ENCOUNTER SYMPTOMS
ABDOMINAL PAIN: 0
DYSURIA: 0
JOINT SWELLING: 0
ACTIVITY CHANGE: 0
ABDOMINAL DISTENTION: 0
CHEST TIGHTNESS: 0
UNEXPECTED WEIGHT CHANGE: 0
COLOR CHANGE: 0
ADENOPATHY: 0
DIFFICULTY URINATING: 0
WEAKNESS: 0
DIZZINESS: 0
SHORTNESS OF BREATH: 0
HEADACHES: 0
FATIGUE: 0

## 2024-08-12 NOTE — PROGRESS NOTES
Annual-menopause  Subjective   Soni Ochoa is a 64 y.o. former pt Dr. Webb/last exam  who is here for a routine exam.   Complaints:   denies vag bleed or discharge; denies pelvic  pain, pressure, or persistent bloating.  PMHx: L breast ca/ stage IA (T1b, N0, M0), ER/MT positive, HER-2/sirisha positive invasive mammary carcinoma diagnosed 2/2018   dr Andino, Osteopenia/osteoporosis   GERD/gastritis Dr. Winchester,   Rheumatoid Arthritis: RF negative, Scl 70 and Anti chromatic Ab positive,   Grade 1 neuropathy,  Herniated disc with L4-5 foraminal stenosis, sciatica left side dr Gold,   i  Rt ankle pain.  Last pap 01/24/2023 neg/neg hpv; Mamm 07/31/23 neg  DEXA 06/2022: spine-2.3/hip-1.4/forearm -3.5; prolia intolerance.  Bcm= TL.   Menarche 16.  Last Period 2014/age 54; used hrt til dxd breast ca, 2018  STDs none.   currently sexually active. Total preg  0.   Surg Hx: ESSURE 5/11/07, bilateral salpingectomy 5/31/07   left wrist ganglion cyst 2/2016   colonoscopy dr Winchester 1/28/2014, 02/2021 polyps; rpt due 2026   left breast lumpectomy/axilla node biopsy (3 nodes) 3/1/18, mediport 3/2018--petraiuolo and Silbiger  cataract surgery 2019,    Father: alive 90 yrs, CABG.  Mother: alive 90 yrs, irregular heart beat.  PGM= colon cancer.   Review of Systems   Constitutional:  Negative for activity change, fatigue and unexpected weight change.   Respiratory:  Negative for chest tightness and shortness of breath.    Cardiovascular:  Negative for chest pain and leg swelling.   Gastrointestinal:  Negative for abdominal distention and abdominal pain.   Genitourinary:  Negative for difficulty urinating, dysuria, genital sores, pelvic pain, vaginal bleeding, vaginal discharge and vaginal pain.   Musculoskeletal:  Negative for gait problem and joint swelling.   Skin:  Negative for color change and rash.   Neurological:  Negative for dizziness, weakness and headaches.   Hematological:  Negative for adenopathy.   Objective  "Visit Vitals  /70   Ht 1.651 m (5' 5\")   Wt 79.2 kg (174 lb 9.6 oz)   BMI 29.05 kg/m²   OB Status Postmenopausal   Smoking Status Every Day   BSA 1.91 m²       General:   Alert and oriented, in no acute distress   Neck: Supple. No visible thyromegaly.    Breast/Axilla: Normal to palpation bilaterally without masses, skin changes, or nipple discharge.    Abdomen: Soft, non-tender, without masses or organomegaly   Vulva: Normal architecture without erythema, masses, or lesions.    Vagina: Normal mucosa without lesions, masses.  Positive atrophy. No abnormal vaginal discharge.    Cervix: Normal without masses, lesions, or signs of cervicitis.    Uterus: Normal mobile, non-enlarged uterus    Adnexa: No palpable masses or tenderness   Pelvic Floor No POP noted. No high tone pelvic floor    Psych  Rectal Normal affect. Normal mood.      Assessment/Plan   Encounter Diagnoses   Name Primary?    Visit for gynecologic examination; grossly benign breast/gyn exams. Yes    Lichen sclerosus; small focus  in crease between RT labia majora and minora. Asx per pt     Encounter for screening mammogram for malignant neoplasm of breast; has order form onco     History of left breast cancer; no evid dz     Menopause; infreq mild sxs.     Postmenopausal atrophic vaginitis; asx     Postmenopausal bone loss; hx gerd precludes bisphosphonates; intol prolia; sees rheum     Screen for colon cancer; utd and neg.     Marie Fernandes MD    "

## 2024-08-13 ENCOUNTER — TELEPHONE (OUTPATIENT)
Dept: PAIN MEDICINE | Facility: CLINIC | Age: 64
End: 2024-08-13
Payer: COMMERCIAL

## 2024-08-14 ENCOUNTER — OFFICE VISIT (OUTPATIENT)
Dept: OBSTETRICS AND GYNECOLOGY | Facility: CLINIC | Age: 64
End: 2024-08-14
Payer: COMMERCIAL

## 2024-08-14 VITALS
SYSTOLIC BLOOD PRESSURE: 126 MMHG | HEIGHT: 65 IN | BODY MASS INDEX: 29.09 KG/M2 | WEIGHT: 174.6 LBS | DIASTOLIC BLOOD PRESSURE: 70 MMHG

## 2024-08-14 DIAGNOSIS — Z01.419 VISIT FOR GYNECOLOGIC EXAMINATION: Primary | ICD-10-CM

## 2024-08-14 DIAGNOSIS — Z78.0 MENOPAUSE: ICD-10-CM

## 2024-08-14 DIAGNOSIS — Z12.11 SCREEN FOR COLON CANCER: ICD-10-CM

## 2024-08-14 DIAGNOSIS — M81.0 POSTMENOPAUSAL BONE LOSS: ICD-10-CM

## 2024-08-14 DIAGNOSIS — N95.2 POSTMENOPAUSAL ATROPHIC VAGINITIS: ICD-10-CM

## 2024-08-14 DIAGNOSIS — Z12.31 ENCOUNTER FOR SCREENING MAMMOGRAM FOR MALIGNANT NEOPLASM OF BREAST: ICD-10-CM

## 2024-08-14 DIAGNOSIS — Z85.3 HISTORY OF LEFT BREAST CANCER: ICD-10-CM

## 2024-08-14 DIAGNOSIS — L90.0 LICHEN SCLEROSUS: ICD-10-CM

## 2024-08-14 PROCEDURE — 3074F SYST BP LT 130 MM HG: CPT | Performed by: OBSTETRICS & GYNECOLOGY

## 2024-08-14 PROCEDURE — 3008F BODY MASS INDEX DOCD: CPT | Performed by: OBSTETRICS & GYNECOLOGY

## 2024-08-14 PROCEDURE — 99396 PREV VISIT EST AGE 40-64: CPT | Performed by: OBSTETRICS & GYNECOLOGY

## 2024-08-14 PROCEDURE — 3078F DIAST BP <80 MM HG: CPT | Performed by: OBSTETRICS & GYNECOLOGY

## 2024-08-14 ASSESSMENT — LIFESTYLE VARIABLES
HOW OFTEN DO YOU HAVE A DRINK CONTAINING ALCOHOL: NEVER
HOW MANY STANDARD DRINKS CONTAINING ALCOHOL DO YOU HAVE ON A TYPICAL DAY: PATIENT DOES NOT DRINK
HOW OFTEN DO YOU HAVE SIX OR MORE DRINKS ON ONE OCCASION: NEVER
AUDIT-C TOTAL SCORE: 0
SKIP TO QUESTIONS 9-10: 1

## 2024-08-14 ASSESSMENT — ENCOUNTER SYMPTOMS
OCCASIONAL FEELINGS OF UNSTEADINESS: 0
LOSS OF SENSATION IN FEET: 0
DEPRESSION: 0

## 2024-08-14 ASSESSMENT — PAIN SCALES - GENERAL: PAINLEVEL: 2

## 2024-08-16 ENCOUNTER — PREP FOR PROCEDURE (OUTPATIENT)
Dept: PAIN MEDICINE | Facility: CLINIC | Age: 64
End: 2024-08-16
Payer: COMMERCIAL

## 2024-08-16 DIAGNOSIS — M25.562 CHRONIC PAIN OF LEFT KNEE: Primary | ICD-10-CM

## 2024-08-16 DIAGNOSIS — G89.29 CHRONIC PAIN OF LEFT KNEE: Primary | ICD-10-CM

## 2024-08-19 NOTE — TELEPHONE ENCOUNTER
Called patient and left a message so the patient can be re-educated her that unfortunately we have to have a certain amount of time in between injections per her insurance and her insurance requires certain documentation so unfortunately there will be no more just calling in for injections. I left her the office number and advised her to call her insurance company with further questions in regards to their requirements.

## 2024-08-19 NOTE — TELEPHONE ENCOUNTER
"Patient states dr allison told her in the past that she could call him anytime and let him know if she wanted her injections earlier and he would \"squeeze her in\". Patient states her next injection is scheduled for sept 10th and she states she can not wait that long and would like it earlier.  "

## 2024-08-21 ENCOUNTER — APPOINTMENT (OUTPATIENT)
Dept: RADIOLOGY | Facility: HOSPITAL | Age: 64
End: 2024-08-21
Payer: COMMERCIAL

## 2024-08-26 NOTE — TELEPHONE ENCOUNTER
Patient left a message requesting a sooner appt. I told the patient 9-10 is our first available at [facility] and the first available at [facility] is 9-17. I explained to the patient that unfortuantely her insurance company is about 1 month turn around time. We scheduled her a day after receiving the authorization. Patient is upset because she would like her injections every 2 months but unfortunately her insurance company follows medicare guidelines and does not approve every 2 month injections. I asked the patient what I can do to help her. She said she would like the process fixed. I explained to her that we are following exactly what her insruance company wants in order to get her in every 3 months for her procedures. She was seen on 7-8. Her authorization was submitted shortly after and we received the authorization on 8-15 and she was scheduled promptly the next day. I explained to her it was against policy to schedule her next one without an order/authorization. Patient was still very upset and stated that I can't help her and hung up.

## 2024-09-10 ENCOUNTER — HOSPITAL ENCOUNTER (OUTPATIENT)
Dept: GASTROENTEROLOGY | Facility: HOSPITAL | Age: 64
Discharge: HOME | End: 2024-09-10
Payer: COMMERCIAL

## 2024-09-10 VITALS
DIASTOLIC BLOOD PRESSURE: 81 MMHG | BODY MASS INDEX: 29.16 KG/M2 | TEMPERATURE: 98.1 F | SYSTOLIC BLOOD PRESSURE: 131 MMHG | WEIGHT: 175 LBS | HEIGHT: 65 IN | HEART RATE: 77 BPM | RESPIRATION RATE: 16 BRPM | OXYGEN SATURATION: 100 %

## 2024-09-10 DIAGNOSIS — M54.16 LUMBAR RADICULOPATHY: ICD-10-CM

## 2024-09-10 PROCEDURE — 64484 NJX AA&/STRD TFRM EPI L/S EA: CPT | Performed by: ANESTHESIOLOGY

## 2024-09-10 PROCEDURE — 64483 NJX AA&/STRD TFRM EPI L/S 1: CPT | Performed by: ANESTHESIOLOGY

## 2024-09-10 PROCEDURE — 2500000005 HC RX 250 GENERAL PHARMACY W/O HCPCS: Performed by: ANESTHESIOLOGY

## 2024-09-10 PROCEDURE — 2500000004 HC RX 250 GENERAL PHARMACY W/ HCPCS (ALT 636 FOR OP/ED): Performed by: ANESTHESIOLOGY

## 2024-09-10 PROCEDURE — 2550000001 HC RX 255 CONTRASTS: Performed by: ANESTHESIOLOGY

## 2024-09-10 RX ORDER — LIDOCAINE HYDROCHLORIDE 5 MG/ML
INJECTION, SOLUTION INFILTRATION; INTRAVENOUS AS NEEDED
Status: COMPLETED | OUTPATIENT
Start: 2024-09-10 | End: 2024-09-10

## 2024-09-10 RX ORDER — TRIAMCINOLONE ACETONIDE 40 MG/ML
INJECTION, SUSPENSION INTRA-ARTICULAR; INTRAMUSCULAR AS NEEDED
Status: COMPLETED | OUTPATIENT
Start: 2024-09-10 | End: 2024-09-10

## 2024-09-10 ASSESSMENT — PAIN - FUNCTIONAL ASSESSMENT
PAIN_FUNCTIONAL_ASSESSMENT: 0-10
PAIN_FUNCTIONAL_ASSESSMENT: 0-10

## 2024-09-10 ASSESSMENT — ENCOUNTER SYMPTOMS
DEPRESSION: 0
LOSS OF SENSATION IN FEET: 1
OCCASIONAL FEELINGS OF UNSTEADINESS: 1

## 2024-09-10 ASSESSMENT — PAIN SCALES - GENERAL
PAINLEVEL_OUTOF10: 10 - WORST POSSIBLE PAIN
PAINLEVEL_OUTOF10: 10 - WORST POSSIBLE PAIN

## 2024-09-10 ASSESSMENT — PAIN DESCRIPTION - DESCRIPTORS
DESCRIPTORS: SHARP;SPASM;ACHING
DESCRIPTORS: SORE;PRESSURE

## 2024-09-10 NOTE — POST-PROCEDURE NOTE
"Patient brought back from procedure room. Bandaid to back dry and intact. Patient states left sided back pain rated 10/10 as was 10/10 on arrival, denies any weakness or decrease in sensation. Patient states \"I will be okay\". Discharge instructions explained to patient and copy provided. Patient denies any questions. Patient to follow up as scheduled.    "

## 2024-09-13 ENCOUNTER — HOSPITAL ENCOUNTER (OUTPATIENT)
Dept: RADIOLOGY | Facility: HOSPITAL | Age: 64
Discharge: HOME | End: 2024-09-13
Payer: COMMERCIAL

## 2024-09-13 VITALS — HEIGHT: 65 IN | BODY MASS INDEX: 29.16 KG/M2 | WEIGHT: 175 LBS

## 2024-09-13 DIAGNOSIS — Z12.31 SCREENING MAMMOGRAM FOR HIGH-RISK PATIENT: ICD-10-CM

## 2024-09-13 DIAGNOSIS — Z85.3 HISTORY OF LEFT BREAST CANCER: ICD-10-CM

## 2024-09-13 PROCEDURE — 77067 SCR MAMMO BI INCL CAD: CPT

## 2024-09-23 ENCOUNTER — OFFICE VISIT (OUTPATIENT)
Dept: PAIN MEDICINE | Facility: CLINIC | Age: 64
End: 2024-09-23
Payer: COMMERCIAL

## 2024-09-23 VITALS
RESPIRATION RATE: 22 BRPM | SYSTOLIC BLOOD PRESSURE: 140 MMHG | HEIGHT: 65 IN | BODY MASS INDEX: 29.16 KG/M2 | DIASTOLIC BLOOD PRESSURE: 66 MMHG | HEART RATE: 89 BPM | WEIGHT: 175 LBS | OXYGEN SATURATION: 97 %

## 2024-09-23 DIAGNOSIS — G89.29 CHRONIC PAIN OF LEFT KNEE: ICD-10-CM

## 2024-09-23 DIAGNOSIS — M54.16 LUMBAR RADICULOPATHY: ICD-10-CM

## 2024-09-23 DIAGNOSIS — M25.562 CHRONIC PAIN OF LEFT KNEE: ICD-10-CM

## 2024-09-23 PROCEDURE — 2500000005 HC RX 250 GENERAL PHARMACY W/O HCPCS: Performed by: ANESTHESIOLOGY

## 2024-09-23 PROCEDURE — 20610 DRAIN/INJ JOINT/BURSA W/O US: CPT | Performed by: ANESTHESIOLOGY

## 2024-09-23 PROCEDURE — 2500000004 HC RX 250 GENERAL PHARMACY W/ HCPCS (ALT 636 FOR OP/ED): Performed by: ANESTHESIOLOGY

## 2024-09-23 PROCEDURE — 99214 OFFICE O/P EST MOD 30 MIN: CPT | Performed by: ANESTHESIOLOGY

## 2024-09-23 PROCEDURE — 4004F PT TOBACCO SCREEN RCVD TLK: CPT | Performed by: ANESTHESIOLOGY

## 2024-09-23 PROCEDURE — 3077F SYST BP >= 140 MM HG: CPT | Performed by: ANESTHESIOLOGY

## 2024-09-23 PROCEDURE — 3078F DIAST BP <80 MM HG: CPT | Performed by: ANESTHESIOLOGY

## 2024-09-23 PROCEDURE — 96372 THER/PROPH/DIAG INJ SC/IM: CPT | Performed by: ANESTHESIOLOGY

## 2024-09-23 PROCEDURE — 99214 OFFICE O/P EST MOD 30 MIN: CPT | Mod: 25 | Performed by: ANESTHESIOLOGY

## 2024-09-23 PROCEDURE — 3008F BODY MASS INDEX DOCD: CPT | Performed by: ANESTHESIOLOGY

## 2024-09-23 RX ORDER — TRIAMCINOLONE ACETONIDE 40 MG/ML
40 INJECTION, SUSPENSION INTRA-ARTICULAR; INTRAMUSCULAR ONCE
Status: COMPLETED | OUTPATIENT
Start: 2024-09-23 | End: 2024-09-23

## 2024-09-23 RX ORDER — CELECOXIB 200 MG/1
200 CAPSULE ORAL
Qty: 60 CAPSULE | Refills: 2 | Status: SHIPPED | OUTPATIENT
Start: 2024-09-23

## 2024-09-23 RX ORDER — OXYCODONE AND ACETAMINOPHEN 5; 325 MG/1; MG/1
1 TABLET ORAL EVERY 6 HOURS PRN
Qty: 5 TABLET | Refills: 0 | Status: SHIPPED | OUTPATIENT
Start: 2024-09-23 | End: 2024-09-30

## 2024-09-23 RX ORDER — DULOXETIN HYDROCHLORIDE 60 MG/1
60 CAPSULE, DELAYED RELEASE ORAL DAILY
Qty: 30 CAPSULE | Refills: 2 | Status: SHIPPED | OUTPATIENT
Start: 2024-09-23 | End: 2024-12-22

## 2024-09-23 RX ORDER — LIDOCAINE HYDROCHLORIDE 10 MG/ML
4 INJECTION, SOLUTION EPIDURAL; INFILTRATION; INTRACAUDAL; PERINEURAL ONCE
Status: COMPLETED | OUTPATIENT
Start: 2024-09-23 | End: 2024-09-23

## 2024-09-23 ASSESSMENT — PAIN SCALES - GENERAL
PAINLEVEL_OUTOF10: 8
PAINLEVEL: 9

## 2024-09-23 ASSESSMENT — PAIN DESCRIPTION - DESCRIPTORS: DESCRIPTORS: ACHING;SHARP

## 2024-09-23 ASSESSMENT — ENCOUNTER SYMPTOMS
ACTIVITY CHANGE: 1
BACK PAIN: 1

## 2024-09-23 ASSESSMENT — PAIN - FUNCTIONAL ASSESSMENT: PAIN_FUNCTIONAL_ASSESSMENT: 0-10

## 2024-09-23 NOTE — PROGRESS NOTES
The patient is a 64-year-old female with low back and left leg pain as well as left knee pain.  The patient is here to have her intra-articular knee injection for her left knee pain.  She has responded well to these injections in the past and requests another 1.  She is benefiting from the use of duloxetine and celecoxib.  The patient is in need of a refill.  The patient was given a few oxycodone/acetaminophen pills 4 years ago by her primary care doctor.  The patient takes these pills very sparingly.  She does request a prescription for 5 more pills.    Review of Systems   Constitutional:  Positive for activity change.   Musculoskeletal:  Positive for back pain and gait problem.   All other systems reviewed and are negative.    GENERAL: alert and appropriate, in no distress, well-hydrated, well-nourished and happy, smiling, interactive  SKIN: no rash noted  RESPIRATORY: breathing non-labored and no grunting/flaring/retractions  CHEST: equal chest rise with normal respiratory effort  ABDOMEN: soft and non-tender  BACK: back normal in appearance, spine with reduced ROM  EXTREMITIES: strength intact  NEUROLOGIC: gait antalgic, SLR negative, sensation grossly intact.    Assessment and Plan    -Chronicity--chronic musculoskeletal pain    -Diagnostics--no new imaging ordered    -Pharmacologic--the patient may have refills of the duloxetine, celecoxib and she may have a few oxycodone/acetaminophen pills.    -Psychologic--no need for psychologic intervention from my standpoint.  There are no mental health issues of which I am aware that are contributing to the patient's pain.  There are no substance abuse or alcohol abuse issues of which I am aware that are contributing to the patient's pain.    -Physical--we discussed the importance of physical therapy and exercise.  We discussed avoidance and modification techniques.    -Intervention--left intra-articular knee injection today.  I explained the risks benefits and  alternatives of the procedure to the patient.  The patient wishes to proceed.  We will consider repeating the transforaminal epidural steroid injection on the left at the L4 and L5 levels again in the future when appropriate    I spent time educating the patient on the condition including the treatment and the prognosis.  I invited the patient to call at anytime with any questions.    The patient was placed in the seated position.  Her skin was prepped with isopropyl alcohol.  A 25-gauge spinal needle was used to inject 40 mg of triamcinolone and 4 mL of 1% lidocaine.  The needle was removed.  There were no complications.  The patient tolerated the procedure well.    Preprocedure VAS 8  Postprocedure VAS 2

## 2024-10-05 DIAGNOSIS — I10 ESSENTIAL HYPERTENSION: ICD-10-CM

## 2024-10-07 RX ORDER — TRIAMTERENE/HYDROCHLOROTHIAZID 37.5-25 MG
1 TABLET ORAL
Qty: 90 TABLET | Refills: 1 | Status: SHIPPED | OUTPATIENT
Start: 2024-10-07

## 2024-10-11 ENCOUNTER — APPOINTMENT (OUTPATIENT)
Dept: PAIN MEDICINE | Facility: CLINIC | Age: 64
End: 2024-10-11
Payer: COMMERCIAL

## 2024-10-15 ENCOUNTER — OFFICE VISIT (OUTPATIENT)
Dept: RHEUMATOLOGY | Facility: CLINIC | Age: 64
End: 2024-10-15
Payer: COMMERCIAL

## 2024-10-15 VITALS
WEIGHT: 178 LBS | DIASTOLIC BLOOD PRESSURE: 89 MMHG | BODY MASS INDEX: 29.66 KG/M2 | HEIGHT: 65 IN | HEART RATE: 76 BPM | SYSTOLIC BLOOD PRESSURE: 138 MMHG

## 2024-10-15 DIAGNOSIS — R76.8 POSITIVE ANA (ANTINUCLEAR ANTIBODY): Primary | ICD-10-CM

## 2024-10-15 DIAGNOSIS — R76.8 SCL-70 ANTIBODY POSITIVE: ICD-10-CM

## 2024-10-15 DIAGNOSIS — M35.9 UNDIFFERENTIATED CONNECTIVE TISSUE DISEASE (MULTI): ICD-10-CM

## 2024-10-15 DIAGNOSIS — F41.9 ANXIETY DISORDER, UNSPECIFIED: ICD-10-CM

## 2024-10-15 DIAGNOSIS — M79.7 FIBROMYALGIA SYNDROME: ICD-10-CM

## 2024-10-15 PROCEDURE — 99214 OFFICE O/P EST MOD 30 MIN: CPT | Performed by: INTERNAL MEDICINE

## 2024-10-15 ASSESSMENT — ROUTINE ASSESSMENT OF PATIENT INDEX DATA (RAPID3)
IN_OUT_TRANSPORT: WITHOUT ANY DIFFICULTY
SEVERITY_SCORE: 0
FN_SCORE: 2.7
WALK_KILOMETERS: UNABLE TO DO
TOTAL RAPID3 SCORE: 12.2
PARTIPATE_RECREATIONAL_ACTIVITIES: UNABLE TO DO
SUM OF QUESTIONS A TO J: 8
ON A SCALE OF ONE TO TEN, CONSIDERING ALL THE WAYS IN WHICH ILLNESS AND HEALTH CONDITIONS MAY AFFECT YOU AT THIS TIME, PLEASE INDICATE BELOW HOW YOU ARE DOING:: 4.5
SEVERITY_SCORE: HIGH SEVERITY (HS)
ON A SCALE OF ONE TO TEN, CONSIDERING ALL THE WAYS IN WHICH ILLNESS AND HEALTH CONDITIONS MAY AFFECT YOU AT THIS TIME, PLEASE INDICATE BELOW HOW YOU ARE DOING:: 4.5
ON A SCALE OF ONE TO TEN, HOW MUCH PAIN HAVE YOU HAD BECAUSE OF YOUR CONDITION OVER THE PAST WEEK?: 5
FEELINGS_ANXIETY_NERVOUS: WITHOUT ANY DIFFICULTY
LIFT_CUP_TO_MOUTH: WITHOUT ANY DIFFICULTY
IN_OUT_BED: WITHOUT ANY DIFFICULTY
WEIGHTED_TOTAL_SCORE: 4.07
ON A SCALE OF ONE TO TEN, HOW MUCH PAIN HAVE YOU HAD BECAUSE OF YOUR CONDITION OVER THE PAST WEEK?: 5
DRESS_YOURSELF: WITHOUT ANY DIFFICULTY
PICK_CLOTHES_OFF_FLOOR: WITHOUT ANY DIFFICULTY
WALK_FLAT_GROUND: WITH SOME DIFFICULTY
WASH_DRY_BODY: WITH SOME DIFFICULTY
FEELINGS_DEPRESSION: WITHOUT ANY DIFFICULTY
TURN_FAUCETS_OFF: WITHOUT ANY DIFFICULTY
GOOD_NIGHTS_SLEEP: WITH SOME DIFFICULTY

## 2024-10-15 ASSESSMENT — PATIENT HEALTH QUESTIONNAIRE - PHQ9
1. LITTLE INTEREST OR PLEASURE IN DOING THINGS: NEARLY EVERY DAY
9. THOUGHTS THAT YOU WOULD BE BETTER OFF DEAD, OR OF HURTING YOURSELF: NOT AT ALL
SUM OF ALL RESPONSES TO PHQ QUESTIONS 1-9: 9
SUM OF ALL RESPONSES TO PHQ9 QUESTIONS 1 AND 2: 3
2. FEELING DOWN, DEPRESSED OR HOPELESS: NOT AT ALL
7. TROUBLE CONCENTRATING ON THINGS, SUCH AS READING THE NEWSPAPER OR WATCHING TELEVISION: NOT AT ALL
4. FEELING TIRED OR HAVING LITTLE ENERGY: NEARLY EVERY DAY
8. MOVING OR SPEAKING SO SLOWLY THAT OTHER PEOPLE COULD HAVE NOTICED. OR THE OPPOSITE, BEING SO FIGETY OR RESTLESS THAT YOU HAVE BEEN MOVING AROUND A LOT MORE THAN USUAL: NOT AT ALL
5. POOR APPETITE OR OVEREATING: NEARLY EVERY DAY
6. FEELING BAD ABOUT YOURSELF - OR THAT YOU ARE A FAILURE OR HAVE LET YOURSELF OR YOUR FAMILY DOWN: NOT AT ALL
3. TROUBLE FALLING OR STAYING ASLEEP OR SLEEPING TOO MUCH: NOT AT ALL

## 2024-10-15 ASSESSMENT — ENCOUNTER SYMPTOMS
OCCASIONAL FEELINGS OF UNSTEADINESS: 1
DEPRESSION: 0
LOSS OF SENSATION IN FEET: 0

## 2024-10-15 NOTE — PROGRESS NOTES
"Chief Complaint:  This 64 y.o. female presents with the chief complaint of +CHELY/Scl serologies.     Subjective:   HPI   Problem:  1: +CHELY/Scl serologies      The patient reports no significant change in her clinical status. That is, she has not identified any skin changes. There is no identified thickening, tightening, or loss of skin pliability. There are no changes noted in the hands of early scleroderma-associated deformities.  As reported at the patient's most recent visit on 4/11/24, there are no pulmonary symptoms, such as dyspnea. Importantly, the patient has no history of Raynaud's associated with cold exposure.        There is no family history of autoimmunity, to her knowledge.         Since her previous visit, the patient denies any interval infections or other illnesses requiring evaluation and/or treatment.    Review of Systems   All other systems reviewed and are negative.    Objective:   /89   Pulse 76   Ht 1.651 m (5' 5\")   Wt 80.7 kg (178 lb)   BMI 29.62 kg/m²      Physical Exam  Vitals and nursing note reviewed.   Constitutional:       General: She is not in acute distress.     Appearance: Normal appearance. She is obese. She is not ill-appearing, toxic-appearing or diaphoretic.   HENT:      Head: Normocephalic.      Mouth/Throat:      Mouth: Mucous membranes are moist.      Pharynx: Oropharynx is clear.   Eyes:      Extraocular Movements: Extraocular movements intact.      Conjunctiva/sclera: Conjunctivae normal.      Pupils: Pupils are equal, round, and reactive to light.   Neck:      Vascular: No carotid bruit.   Cardiovascular:      Rate and Rhythm: Normal rate and regular rhythm.      Pulses: Normal pulses.      Heart sounds: Normal heart sounds. No murmur heard.  Pulmonary:      Effort: Pulmonary effort is normal. No respiratory distress.      Breath sounds: Normal breath sounds. No wheezing, rhonchi or rales.   Abdominal:      General: Abdomen is flat.      Palpations: Abdomen is " soft. There is no mass.      Tenderness: There is no abdominal tenderness.   Musculoskeletal:         General: No swelling, tenderness, deformity or signs of injury. Normal range of motion.      Cervical back: Normal range of motion and neck supple. No rigidity or tenderness.      Right lower leg: No edema.      Left lower leg: No edema.   Lymphadenopathy:      Cervical: No cervical adenopathy.   Skin:     General: Skin is warm.      Capillary Refill: Capillary refill takes less than 2 seconds.      Findings: No bruising, erythema, lesion or rash.   Neurological:      General: No focal deficit present.      Mental Status: She is alert and oriented to person, place, and time.        Assessment:   Elevated CHELY -R76.8  Elevated anti-Scl70 autoantibody - R76.8  Question of undifferentiated connective tissue disorder (UCTD) - M35.9    Plan:    Query UCTD. Patient returns for follow-up evaluation for autoimmunity, particularly UCTD. Since her previous evaluation, there has been no change in the patient's history, interval development of new symptoms, or general health. A skin exam today was unremarkable; there was no evidence of morphea or changes consistent with cutaneous sclerosis. There are no symptoms of internal disease, for example, pulmonary, renal or GI. I recommend patient be followed yearly. An alternative is the patient could schedule a consultation at a scleroderma center, such as [facility]. I re-explained what findings patient should monitor. I answered her queries. I briefly discussed the pathophysiology, including a potential multigenic basis for the disease that would express serologically but not necessarily clinically. Overall, patient will need to be observant and return early should new symptoms/signs arise. Patient understood and was in agreement. Total time = 38 min, including ~5 min of pre-visit chart/lab assessments.    Yadiel Hughes MD

## 2024-10-16 RX ORDER — CLONAZEPAM 1 MG/1
1 TABLET ORAL DAILY PRN
Qty: 30 TABLET | Refills: 0 | Status: SHIPPED | OUTPATIENT
Start: 2024-10-16

## 2024-10-21 ENCOUNTER — OFFICE VISIT (OUTPATIENT)
Dept: PRIMARY CARE | Facility: CLINIC | Age: 64
End: 2024-10-21
Payer: COMMERCIAL

## 2024-10-21 DIAGNOSIS — K59.00 CONSTIPATION, UNSPECIFIED CONSTIPATION TYPE: Primary | ICD-10-CM

## 2024-10-21 DIAGNOSIS — Z23 ENCOUNTER FOR IMMUNIZATION: ICD-10-CM

## 2024-10-21 DIAGNOSIS — L29.9 EAR ITCHING: ICD-10-CM

## 2024-10-21 PROCEDURE — 90471 IMMUNIZATION ADMIN: CPT | Performed by: INTERNAL MEDICINE

## 2024-10-21 PROCEDURE — 3008F BODY MASS INDEX DOCD: CPT | Performed by: INTERNAL MEDICINE

## 2024-10-21 PROCEDURE — 3075F SYST BP GE 130 - 139MM HG: CPT | Performed by: INTERNAL MEDICINE

## 2024-10-21 PROCEDURE — 90656 IIV3 VACC NO PRSV 0.5 ML IM: CPT | Performed by: INTERNAL MEDICINE

## 2024-10-21 PROCEDURE — 3079F DIAST BP 80-89 MM HG: CPT | Performed by: INTERNAL MEDICINE

## 2024-10-21 PROCEDURE — 99213 OFFICE O/P EST LOW 20 MIN: CPT | Performed by: INTERNAL MEDICINE

## 2024-10-21 RX ORDER — FLUOCINOLONE ACETONIDE 0.11 MG/ML
OIL TOPICAL 3 TIMES DAILY
Qty: 10 ML | Refills: 0 | Status: SHIPPED | OUTPATIENT
Start: 2024-10-21 | End: 2025-10-21

## 2024-10-21 ASSESSMENT — ENCOUNTER SYMPTOMS
DIARRHEA: 0
WEAKNESS: 0
SINUS PAIN: 0
WHEEZING: 0
NECK PAIN: 1
DYSURIA: 0
NUMBNESS: 1
COUGH: 0
PALPITATIONS: 0
DIZZINESS: 0
DEPRESSION: 0
FATIGUE: 1
CONSTIPATION: 1
SHORTNESS OF BREATH: 0
SORE THROAT: 0
LOSS OF SENSATION IN FEET: 0
UNEXPECTED WEIGHT CHANGE: 0
BLOOD IN STOOL: 0
OCCASIONAL FEELINGS OF UNSTEADINESS: 0
BACK PAIN: 1
ABDOMINAL DISTENTION: 0
CONFUSION: 0
NECK STIFFNESS: 1
ADENOPATHY: 0

## 2024-10-21 ASSESSMENT — COLUMBIA-SUICIDE SEVERITY RATING SCALE - C-SSRS
6. HAVE YOU EVER DONE ANYTHING, STARTED TO DO ANYTHING, OR PREPARED TO DO ANYTHING TO END YOUR LIFE?: NO
2. HAVE YOU ACTUALLY HAD ANY THOUGHTS OF KILLING YOURSELF?: NO
1. IN THE PAST MONTH, HAVE YOU WISHED YOU WERE DEAD OR WISHED YOU COULD GO TO SLEEP AND NOT WAKE UP?: NO

## 2024-10-21 ASSESSMENT — PATIENT HEALTH QUESTIONNAIRE - PHQ9
SUM OF ALL RESPONSES TO PHQ9 QUESTIONS 1 AND 2: 0
1. LITTLE INTEREST OR PLEASURE IN DOING THINGS: NOT AT ALL
2. FEELING DOWN, DEPRESSED OR HOPELESS: NOT AT ALL

## 2024-10-21 ASSESSMENT — PAIN SCALES - GENERAL: PAINLEVEL_OUTOF10: 3

## 2024-10-21 NOTE — PROGRESS NOTES
Subjective   Patient ID: Soni Ochoa is a 64 y.o. female who presents for 3 month f/u (Pt would like lactulose prescribed instead on constulose/Itchy left ear).      HPI     H/O Insomnia- stated that trazodone did not help. She was on clonazepam for many years which helped her with sleep and anxiety. Taking Clonazepam, at night helps. Stated that she stopped Arimidex, and her oncologist is aware. Complaining of abdominal bloating since last few years. Has seen Gastroenterologist and work up was negative. History of chronic constipation-. Taking Colace, senna and MiraLax with no relief .    H/O Chronic low back pain- seeing Dr Santiago. H/O RA- Was started on Enbrel, she was on Humira before.     H/O Osteoporosis- Last bone density scan was in 2020, stated Prolia caused stomach upset.     History of vitamin-D deficiency-unable to tolerate vitamin-D tablets, advised to use droplets over-the-counter.     Concerned about weight gain, wants to lose weight. Her weight loss goal is 40 pounds. Stated she lost weight with weight watchers previously. Her activity is limited to back pain.     Discussed weight loss medication options, due to constipation,  In the interim started on metformin,  she stopped it after few weeks as it did not help with weight loss    Seeing Dr. Hughes, possible Sjogren's??       Review of Systems   Constitutional:  Positive for fatigue. Negative for unexpected weight change.   HENT:  Negative for congestion, ear pain, sinus pain and sore throat.    Respiratory:  Negative for cough, shortness of breath and wheezing.    Cardiovascular:  Negative for chest pain, palpitations and leg swelling.   Gastrointestinal:  Positive for constipation. Negative for abdominal distention, blood in stool and diarrhea.   Genitourinary:  Negative for dysuria, pelvic pain and vaginal bleeding.   Musculoskeletal:  Positive for back pain, neck pain and neck stiffness.   Neurological:  Positive for numbness. Negative  "for dizziness, syncope and weakness.   Hematological:  Negative for adenopathy.   Psychiatric/Behavioral:  Negative for behavioral problems, confusion and suicidal ideas. The patient is not nervous/anxious.        Objective   /80 (BP Location: Left arm, Patient Position: Sitting, BP Cuff Size: Large adult)   Pulse 85   Temp 36.4 °C (97.5 °F) (Temporal)   Ht 1.651 m (5' 5\")   Wt 80.3 kg (177 lb)   SpO2 97%   BMI 29.45 kg/m²     Physical Exam  Constitutional:       General: She is not in acute distress.  HENT:      Head: Normocephalic and atraumatic.   Eyes:      Extraocular Movements: Extraocular movements intact.      Pupils: Pupils are equal, round, and reactive to light.   Cardiovascular:      Rate and Rhythm: Normal rate and regular rhythm.      Heart sounds: No murmur heard.  Pulmonary:      Effort: Pulmonary effort is normal.      Breath sounds: Normal breath sounds. No wheezing or rales.   Abdominal:      General: Bowel sounds are normal.      Palpations: Abdomen is soft. There is no mass.      Tenderness: There is no guarding.      Hernia: No hernia is present.   Musculoskeletal:      Right lower leg: No edema.      Left lower leg: No edema.   Lymphadenopathy:      Cervical: No cervical adenopathy.   Skin:     Comments: Ganglion cyst on right wrist   Neurological:      General: No focal deficit present.      Mental Status: She is alert.      Cranial Nerves: No cranial nerve deficit.   Psychiatric:         Mood and Affect: Mood normal.         Assessment/Plan       Soni was seen today for 3 month f/u.  Diagnoses and all orders for this visit:  Constipation, unspecified constipation type (Primary)  Encounter for immunization  Ear itching  -     fluocinolone (Derma-Smoothe/FS Body Oil) 0.01 % external oil; Apply topically 3 times a day.  Other orders  -     Flu vaccine, trivalent, preservative free, age 6 months and greater (Fluarix/Fluzone/Flulaval)      She is upset regarding multiple things today " on office  .  She saw orthopedics for ganglion cyst, was offered surgery which she does not want to go through  [facility] did not cover her recent labs, payment was too high  Next time advised to call  lab to get a quote on price  Complaining of constipation and bloating, stated she was on Linzess previously which caused her more bloating  Advised to follow-up with her gastroenterologist Dr. Winchester        Current Outpatient Medications   Medication Instructions    betamethasone, augmented, (Diprolene, augmented,) 0.05 % ointment Topical, 2 times daily    celecoxib (CELEBREX) 200 mg, oral, 2 times daily (morning and late afternoon)    clonazePAM (KLONOPIN) 1 mg, oral, Daily PRN    docusate sodium (COLACE) 100 mg, oral, 2 times daily PRN    DULoxetine (CYMBALTA) 60 mg, oral, Daily    fluocinolone (Derma-Smoothe/FS Body Oil) 0.01 % external oil Topical, 3 times daily    lactulose (Constulose) 20 gram/30 mL oral solution Take 3 TSP orally every 12 hours as needed    omeprazole (PRILOSEC) 20 mg, oral, Daily    sulfaSALAzine (AZULFIDINE) 1,000 mg, oral, 2 times daily, Do not crush, chew, or split.    sulfaSALAzine (AZULFIDINE) 1,000 mg, oral, 2 times daily, Do not crush, chew, or split.    tiZANidine (ZANAFLEX) 4 mg    traZODone (DESYREL) 150 mg, oral, Nightly    triamterene-hydrochlorothiazid (Maxzide-25) 37.5-25 mg tablet 1 tablet, oral, Daily before breakfast

## 2024-10-24 VITALS
DIASTOLIC BLOOD PRESSURE: 80 MMHG | BODY MASS INDEX: 29.49 KG/M2 | OXYGEN SATURATION: 97 % | HEART RATE: 85 BPM | WEIGHT: 177 LBS | HEIGHT: 65 IN | TEMPERATURE: 97.5 F | SYSTOLIC BLOOD PRESSURE: 138 MMHG

## 2024-10-24 ASSESSMENT — ENCOUNTER SYMPTOMS: NERVOUS/ANXIOUS: 0

## 2024-11-04 ENCOUNTER — TELEPHONE (OUTPATIENT)
Dept: PAIN MEDICINE | Facility: CLINIC | Age: 64
End: 2024-11-04
Payer: COMMERCIAL

## 2024-11-04 ENCOUNTER — PREP FOR PROCEDURE (OUTPATIENT)
Dept: PAIN MEDICINE | Facility: CLINIC | Age: 64
End: 2024-11-04
Payer: COMMERCIAL

## 2024-11-04 DIAGNOSIS — G89.29 CHRONIC PAIN OF LEFT KNEE: Primary | ICD-10-CM

## 2024-11-04 DIAGNOSIS — M25.562 CHRONIC PAIN OF LEFT KNEE: Primary | ICD-10-CM

## 2024-11-04 DIAGNOSIS — M54.16 LUMBAR RADICULOPATHY: ICD-10-CM

## 2024-11-04 NOTE — TELEPHONE ENCOUNTER
The patient is a 64-year-old female with low back and left leg pain.  She has had this pain for many years.  The patient gets excellent and long-term relief from the occasional left L4 and L5 transforaminal epidural steroid injection.  The patient reports at least 80% relief for 3 months on each occasion.  The patient requests another injection.  The patient also experiences left knee pain.  The patient gets excellent and long-term relief from the occasional left intra-articular knee injection.  The patient reports at least 75% relief for 3 months from each injection.  The patient requests another injection.

## 2024-11-04 NOTE — TELEPHONE ENCOUNTER
Pt called wanting to schedule an epidural and a left knee injection.  Pt was hoping she could have this done by the end of the year.

## 2024-11-05 DIAGNOSIS — F41.9 ANXIETY DISORDER, UNSPECIFIED: ICD-10-CM

## 2024-11-05 RX ORDER — CLONAZEPAM 1 MG/1
1 TABLET ORAL DAILY PRN
Qty: 30 TABLET | Refills: 0 | Status: SHIPPED | OUTPATIENT
Start: 2024-11-05

## 2024-11-07 ENCOUNTER — TELEPHONE (OUTPATIENT)
Dept: PAIN MEDICINE | Facility: CLINIC | Age: 64
End: 2024-11-07
Payer: COMMERCIAL

## 2024-11-25 ENCOUNTER — OFFICE VISIT (OUTPATIENT)
Dept: PAIN MEDICINE | Facility: CLINIC | Age: 64
End: 2024-11-25
Payer: COMMERCIAL

## 2024-11-25 VITALS
HEART RATE: 81 BPM | SYSTOLIC BLOOD PRESSURE: 125 MMHG | RESPIRATION RATE: 22 BRPM | DIASTOLIC BLOOD PRESSURE: 79 MMHG | BODY MASS INDEX: 29.49 KG/M2 | HEIGHT: 65 IN | OXYGEN SATURATION: 99 % | WEIGHT: 177 LBS

## 2024-11-25 DIAGNOSIS — G89.29 CHRONIC PAIN OF LEFT KNEE: ICD-10-CM

## 2024-11-25 DIAGNOSIS — M25.562 CHRONIC PAIN OF LEFT KNEE: ICD-10-CM

## 2024-11-25 PROCEDURE — 3008F BODY MASS INDEX DOCD: CPT | Performed by: ANESTHESIOLOGY

## 2024-11-25 PROCEDURE — 2500000004 HC RX 250 GENERAL PHARMACY W/ HCPCS (ALT 636 FOR OP/ED): Performed by: ANESTHESIOLOGY

## 2024-11-25 PROCEDURE — 20610 DRAIN/INJ JOINT/BURSA W/O US: CPT | Performed by: ANESTHESIOLOGY

## 2024-11-25 PROCEDURE — 3078F DIAST BP <80 MM HG: CPT | Performed by: ANESTHESIOLOGY

## 2024-11-25 PROCEDURE — 4004F PT TOBACCO SCREEN RCVD TLK: CPT | Performed by: ANESTHESIOLOGY

## 2024-11-25 PROCEDURE — 96372 THER/PROPH/DIAG INJ SC/IM: CPT | Performed by: ANESTHESIOLOGY

## 2024-11-25 PROCEDURE — 3074F SYST BP LT 130 MM HG: CPT | Performed by: ANESTHESIOLOGY

## 2024-11-25 RX ORDER — TRIAMCINOLONE ACETONIDE 40 MG/ML
40 INJECTION, SUSPENSION INTRA-ARTICULAR; INTRAMUSCULAR ONCE
Status: COMPLETED | OUTPATIENT
Start: 2024-11-25 | End: 2024-11-25

## 2024-11-25 RX ORDER — LIDOCAINE HYDROCHLORIDE 10 MG/ML
4 INJECTION, SOLUTION EPIDURAL; INFILTRATION; INTRACAUDAL; PERINEURAL ONCE
Status: COMPLETED | OUTPATIENT
Start: 2024-11-25 | End: 2024-11-25

## 2024-11-25 ASSESSMENT — PATIENT HEALTH QUESTIONNAIRE - PHQ9
SUM OF ALL RESPONSES TO PHQ9 QUESTIONS 1 & 2: 2
1. LITTLE INTEREST OR PLEASURE IN DOING THINGS: SEVERAL DAYS
10. IF YOU CHECKED OFF ANY PROBLEMS, HOW DIFFICULT HAVE THESE PROBLEMS MADE IT FOR YOU TO DO YOUR WORK, TAKE CARE OF THINGS AT HOME, OR GET ALONG WITH OTHER PEOPLE: SOMEWHAT DIFFICULT
2. FEELING DOWN, DEPRESSED OR HOPELESS: SEVERAL DAYS

## 2024-11-25 ASSESSMENT — PAIN - FUNCTIONAL ASSESSMENT: PAIN_FUNCTIONAL_ASSESSMENT: 0-10

## 2024-11-25 ASSESSMENT — PAIN SCALES - GENERAL
PAINLEVEL_OUTOF10: 8
PAINLEVEL_OUTOF10: 6

## 2024-11-25 ASSESSMENT — PAIN DESCRIPTION - DESCRIPTORS: DESCRIPTORS: ACHING

## 2024-11-25 NOTE — PROGRESS NOTES
The patient was placed in the seated position.  Her skin was prepped with isopropyl alcohol.  A 25-gauge spinal needle was used to inject 40 mg of triamcinolone and 4 mL of 1% lidocaine into the intra-articular aspect of the left knee.  The needle was removed.  There were no complications.  The patient tolerated the procedure well.    Preprocedure VAS 8  Postprocedure VAS 2

## 2024-11-26 ENCOUNTER — HOSPITAL ENCOUNTER (OUTPATIENT)
Dept: GASTROENTEROLOGY | Facility: HOSPITAL | Age: 64
Discharge: HOME | End: 2024-11-26
Payer: COMMERCIAL

## 2024-11-26 VITALS
HEART RATE: 93 BPM | TEMPERATURE: 96.8 F | DIASTOLIC BLOOD PRESSURE: 83 MMHG | WEIGHT: 180 LBS | RESPIRATION RATE: 18 BRPM | SYSTOLIC BLOOD PRESSURE: 147 MMHG | HEIGHT: 65 IN | BODY MASS INDEX: 29.99 KG/M2 | OXYGEN SATURATION: 98 %

## 2024-11-26 DIAGNOSIS — M54.16 LUMBAR RADICULOPATHY: ICD-10-CM

## 2024-11-26 PROCEDURE — 64483 NJX AA&/STRD TFRM EPI L/S 1: CPT | Performed by: ANESTHESIOLOGY

## 2024-11-26 PROCEDURE — 2550000001 HC RX 255 CONTRASTS: Performed by: ANESTHESIOLOGY

## 2024-11-26 PROCEDURE — 64484 NJX AA&/STRD TFRM EPI L/S EA: CPT | Performed by: ANESTHESIOLOGY

## 2024-11-26 PROCEDURE — 2500000004 HC RX 250 GENERAL PHARMACY W/ HCPCS (ALT 636 FOR OP/ED): Performed by: ANESTHESIOLOGY

## 2024-11-26 RX ORDER — TRIAMCINOLONE ACETONIDE 40 MG/ML
INJECTION, SUSPENSION INTRA-ARTICULAR; INTRAMUSCULAR AS NEEDED
Status: COMPLETED | OUTPATIENT
Start: 2024-11-26 | End: 2024-11-26

## 2024-11-26 RX ORDER — LIDOCAINE HYDROCHLORIDE 5 MG/ML
INJECTION, SOLUTION INFILTRATION; INTRAVENOUS AS NEEDED
Status: COMPLETED | OUTPATIENT
Start: 2024-11-26 | End: 2024-11-26

## 2024-11-26 ASSESSMENT — COLUMBIA-SUICIDE SEVERITY RATING SCALE - C-SSRS
1. IN THE PAST MONTH, HAVE YOU WISHED YOU WERE DEAD OR WISHED YOU COULD GO TO SLEEP AND NOT WAKE UP?: NO
6. HAVE YOU EVER DONE ANYTHING, STARTED TO DO ANYTHING, OR PREPARED TO DO ANYTHING TO END YOUR LIFE?: NO
2. HAVE YOU ACTUALLY HAD ANY THOUGHTS OF KILLING YOURSELF?: NO

## 2024-11-26 ASSESSMENT — ENCOUNTER SYMPTOMS
DEPRESSION: 0
LOSS OF SENSATION IN FEET: 1
OCCASIONAL FEELINGS OF UNSTEADINESS: 0

## 2024-11-26 ASSESSMENT — PAIN DESCRIPTION - DESCRIPTORS: DESCRIPTORS: SHARP;SHOOTING;STABBING

## 2024-11-26 ASSESSMENT — PAIN - FUNCTIONAL ASSESSMENT: PAIN_FUNCTIONAL_ASSESSMENT: 0-10

## 2024-11-26 NOTE — H&P
"History Of Present Illness  Soni Ochoa is a 64 y.o. female presenting with low back and left leg pain.     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 She started smoking about 49 years ago. She has a 12.5 pack-year smoking history. She has never been exposed to tobacco smoke. She has never used smokeless tobacco. She reports that she does not currently use alcohol. She reports that she does not use drugs.    Family History  Family History   Problem Relation Name Age of Onset    Irregular heart beat Mother      Coronary artery disease Father      Heart disease Father          CABG,    Heart disease Brother      Diabetes Brother      Colon cancer Paternal Grandmother          Allergies  Denosumab, Ketorolac, and Nickel    Review of Systems     Physical Exam     Last Recorded Vitals  Blood pressure (!) 145/96, pulse 105, temperature 36 °C (96.8 °F), temperature source Temporal, resp. rate 17, height 1.651 m (5' 5\"), weight 81.6 kg (180 lb), SpO2 98%.    Relevant Results             Assessment/Plan   Assessment & Plan  Lumbar radiculopathy      Left L4 and L5 " transforaminal epidural steroid injection       I spent 10 minutes in the professional and overall care of this patient.      Konrad Santiago MD

## 2024-11-26 NOTE — DISCHARGE INSTRUCTIONS
You underwent a procedure today    After most procedures, it is recommended that you relax and limit your activity for the remainder of the day unless you have been told otherwise by your pain physician.  You should not drive a car, operate machinery, or make important legal decisions unless otherwise directed by your pain physician.  You may resume your normal activity, including exercise, tomorrow.      Keep a written pain diary of how much pain relief you experienced following the procedure and the length of time of pain relief you experienced pain relief. Following diagnostic injections like medial branch nerve blocks, sacroiliac joint blocks, stellate ganglion injections and other blocks, it is very important you record the specific amount of pain relief you experienced immediately after the injectionand how long it lasted. Your doctor will ask you for this information at your follow up visit.     For all injections, please keep the injection site dry and inspect the site for a couple of days. You may remove the Band-Aid the day of the injection at any time.     Some discomfort, bruising or slight swelling may occur at the injection site. This is not abnormal if it occurs.  If needed you may:    -Take over the counter medication such as Tylenol or Motrin.   -Apply an ice pack for 30 minutes, 2 to 3 times a day for the first 24 hours.     You may shower today; no soaking baths, hot tubs, whirlpools or swimming pools for two days.      If you are given steroids in your injection, it may take 3-5 days for the steroid medication to take effect. You may notice a worsening of your symptoms for 1-2 days after the injection. This is not abnormal.  You may use acetaminophen, ibuprofen, or prescription medication that your doctor may have prescribed for you if you need to do so.     A few common side effects of steroids include facial flushing, sweating, restlessness, irritability,difficulty sleeping, increase in blood  sugar, and increased blood pressure. If you have diabetes, please monitor your blood sugar at least once a day for at least 5 days. If you have poorly controlled high blood pressure, monitoryour blood pressure for at least 2 days and contact your primary care physician if these numbers are unusually high for you.      If you take aspirin or non-steroidal anti-inflammatory drugs (examples are Motrin, Advil, ibuprofen, Naprosyn, Voltaren, Relafen, etc.) you may restart these this evening.    You do not need to discontinue non-aspirin-containing pain medications prior to an injection (examples: Celebrex, tramadol, hydrocodone and acetaminophen).      If you take a blood thinning medication (Coumadin, Lovenox, Fragmin,Ticlid, Plavix, Pradaxa, etc.), please discuss this with your primary care physician/cardiologist and your pain physician. These medications MUST be discontinued before you can have an injection safely, without the risk of uncontrolled bleeding. If these medications are not discontinued for an appropriate period of time, you will not be able to receivean injection.      If you are taking Coumadin, please have your INR checked the morning of your procedure and bringthe result to your appointment unless otherwise instructed. If your INR is over 1.2, your injection may need to be rescheduled to avoid uncontrolled bleeding from the needle placement.     Call [facility] at [phone] between 8am-4pm Monday - Friday if you are experiencing the following:    If you received an epidural or spinal injection:    -Headache that doesnot go away with medicine, is worse when sitting or standing up, and is greatly relieved upon lying down.   -Severe pain worse than or different than your baseline pain.   -Chills or fever (101º F or greater).   -Drainage or signs of infection at the injection site     Go directly to the Emergency Department if you are experiencing the following and received an  epidural or spinal injection:   -Abrupt weakness or progressive weakness in your legs that starts after you leave the clinic.   -Abrupt severe or worsening numbness in your legs.   -Inability to urinate after the injection or loss of bowel or bladder control without the urge to defecate or urinate.     If you have a clinical question that cannot wait until your next appointment, please call [phone] between 8am-4pm Monday - Friday or send a [facility] message. We do our best to return all non-emergency messages within 24 hours, Monday - Friday. A nurse or physician will return your message.      If you need to cancel an appointment, please call the scheduling staff at [phone] during normal business hours or leave a message at least 24 hours in advance.     If you are going to be sedated for your next procedure, you MUST have responsible adult who can legally drive accompany you home. You cannot eat or drink for eight hours prior to the planned procedure if you are going to receive sedation. You may take your non-blood thinning medications with a small sip of water.

## 2024-12-10 DIAGNOSIS — F41.9 ANXIETY DISORDER, UNSPECIFIED: ICD-10-CM

## 2024-12-10 RX ORDER — CLONAZEPAM 1 MG/1
1 TABLET ORAL DAILY PRN
Qty: 30 TABLET | Refills: 1 | Status: SHIPPED | OUTPATIENT
Start: 2024-12-10

## 2025-01-03 DIAGNOSIS — M54.16 LUMBAR RADICULOPATHY: ICD-10-CM

## 2025-01-03 DIAGNOSIS — F51.01 PRIMARY INSOMNIA: ICD-10-CM

## 2025-01-03 DIAGNOSIS — K21.9 GASTRO-ESOPHAGEAL REFLUX DISEASE WITHOUT ESOPHAGITIS: ICD-10-CM

## 2025-01-03 DIAGNOSIS — K59.00 CONSTIPATION, UNSPECIFIED CONSTIPATION TYPE: ICD-10-CM

## 2025-01-03 RX ORDER — DOCUSATE SODIUM 100 MG/1
100 CAPSULE, LIQUID FILLED ORAL 2 TIMES DAILY PRN
Qty: 180 CAPSULE | Refills: 1 | Status: SHIPPED | OUTPATIENT
Start: 2025-01-03

## 2025-01-03 RX ORDER — TRAZODONE HYDROCHLORIDE 50 MG/1
150 TABLET ORAL NIGHTLY
Qty: 270 TABLET | Refills: 1 | Status: SHIPPED | OUTPATIENT
Start: 2025-01-03

## 2025-01-03 RX ORDER — OMEPRAZOLE 20 MG/1
20 CAPSULE, DELAYED RELEASE ORAL DAILY
Qty: 90 CAPSULE | Refills: 1 | Status: SHIPPED | OUTPATIENT
Start: 2025-01-03

## 2025-01-06 RX ORDER — DULOXETIN HYDROCHLORIDE 60 MG/1
60 CAPSULE, DELAYED RELEASE ORAL DAILY
Qty: 30 CAPSULE | Refills: 2 | Status: SHIPPED | OUTPATIENT
Start: 2025-01-06 | End: 2025-04-06

## 2025-01-06 RX ORDER — CELECOXIB 200 MG/1
200 CAPSULE ORAL
Qty: 60 CAPSULE | Refills: 2 | Status: SHIPPED | OUTPATIENT
Start: 2025-01-06

## 2025-01-10 ENCOUNTER — PREP FOR PROCEDURE (OUTPATIENT)
Dept: PAIN MEDICINE | Facility: CLINIC | Age: 65
End: 2025-01-10
Payer: COMMERCIAL

## 2025-01-10 ENCOUNTER — TELEPHONE (OUTPATIENT)
Dept: PAIN MEDICINE | Facility: CLINIC | Age: 65
End: 2025-01-10

## 2025-01-10 DIAGNOSIS — G89.29 CHRONIC PAIN OF LEFT KNEE: Primary | ICD-10-CM

## 2025-01-10 DIAGNOSIS — M25.562 CHRONIC PAIN OF LEFT KNEE: Primary | ICD-10-CM

## 2025-01-10 DIAGNOSIS — M54.16 LUMBAR RADICULOPATHY: Primary | ICD-10-CM

## 2025-01-10 NOTE — TELEPHONE ENCOUNTER
Pt called wanting to let you know that she would like to repeat the epidural and left knee injection she had this past November.  She has an office visit with you on Monday to discuss.

## 2025-01-13 ENCOUNTER — OFFICE VISIT (OUTPATIENT)
Dept: PAIN MEDICINE | Facility: CLINIC | Age: 65
End: 2025-01-13
Payer: COMMERCIAL

## 2025-01-13 VITALS
HEIGHT: 65 IN | WEIGHT: 180 LBS | SYSTOLIC BLOOD PRESSURE: 124 MMHG | RESPIRATION RATE: 22 BRPM | BODY MASS INDEX: 29.99 KG/M2 | DIASTOLIC BLOOD PRESSURE: 70 MMHG | OXYGEN SATURATION: 99 % | HEART RATE: 77 BPM

## 2025-01-13 DIAGNOSIS — M54.16 LUMBAR RADICULOPATHY: Primary | ICD-10-CM

## 2025-01-13 ASSESSMENT — PATIENT HEALTH QUESTIONNAIRE - PHQ9
SUM OF ALL RESPONSES TO PHQ9 QUESTIONS 1 & 2: 2
1. LITTLE INTEREST OR PLEASURE IN DOING THINGS: SEVERAL DAYS
2. FEELING DOWN, DEPRESSED OR HOPELESS: SEVERAL DAYS
10. IF YOU CHECKED OFF ANY PROBLEMS, HOW DIFFICULT HAVE THESE PROBLEMS MADE IT FOR YOU TO DO YOUR WORK, TAKE CARE OF THINGS AT HOME, OR GET ALONG WITH OTHER PEOPLE: SOMEWHAT DIFFICULT

## 2025-01-13 ASSESSMENT — PAIN DESCRIPTION - DESCRIPTORS: DESCRIPTORS: ACHING

## 2025-01-13 ASSESSMENT — ENCOUNTER SYMPTOMS
BACK PAIN: 1
ACTIVITY CHANGE: 1
ARTHRALGIAS: 1

## 2025-01-13 ASSESSMENT — PAIN SCALES - GENERAL
PAINLEVEL_OUTOF10: 9
PAINLEVEL_OUTOF10: 9

## 2025-01-13 ASSESSMENT — PAIN - FUNCTIONAL ASSESSMENT: PAIN_FUNCTIONAL_ASSESSMENT: 0-10

## 2025-01-13 NOTE — PROGRESS NOTES
Review of Systems   Constitutional:  Positive for activity change.   Musculoskeletal:  Positive for arthralgias, back pain and gait problem.   All other systems reviewed and are negative.    GENERAL: alert and appropriate, in no distress, well-hydrated, well-nourished and happy, smiling, interactive  SKIN: no rash noted  RESPIRATORY: breathing non-labored and no grunting/flaring/retractions  CHEST: equal chest rise with normal respiratory effort  ABDOMEN: soft and non-tender  BACK: back normal in appearance, spine with reduced ROM  EXTREMITIES: strength intact  NEUROLOGIC: gait antalgic, SLR negative, sensation grossly intact.    Assessment and Plan    -Chronicity--***    -Diagnostics--***    -Pharmacologic--***    -Psychologic--no need for psychologic intervention from my standpoint.  There are no mental health issues of which I am aware that are contributing to the patient's pain.  There are no substance abuse or alcohol abuse issues of which I am aware that are contributing to the patient's pain.    -Physical--we discussed the importance of physical therapy and exercise.  We discussed avoidance and modification techniques.    -Intervention--***    I spent time educating the patient on the condition including the treatment and the prognosis.  I invited the patient to call at anytime with any questions.

## 2025-01-27 ENCOUNTER — OFFICE VISIT (OUTPATIENT)
Dept: PRIMARY CARE | Facility: CLINIC | Age: 65
End: 2025-01-27
Payer: COMMERCIAL

## 2025-01-27 VITALS
HEART RATE: 71 BPM | WEIGHT: 176 LBS | DIASTOLIC BLOOD PRESSURE: 70 MMHG | TEMPERATURE: 97.7 F | OXYGEN SATURATION: 95 % | BODY MASS INDEX: 29.32 KG/M2 | HEIGHT: 65 IN | SYSTOLIC BLOOD PRESSURE: 126 MMHG

## 2025-01-27 DIAGNOSIS — M81.0 POST-MENOPAUSAL OSTEOPOROSIS: Primary | ICD-10-CM

## 2025-01-27 DIAGNOSIS — F41.9 ANXIETY DISORDER, UNSPECIFIED: ICD-10-CM

## 2025-01-27 DIAGNOSIS — E88.819 INSULIN RESISTANCE: ICD-10-CM

## 2025-01-27 DIAGNOSIS — I10 ESSENTIAL HYPERTENSION: ICD-10-CM

## 2025-01-27 PROCEDURE — 3074F SYST BP LT 130 MM HG: CPT | Performed by: INTERNAL MEDICINE

## 2025-01-27 PROCEDURE — 3008F BODY MASS INDEX DOCD: CPT | Performed by: INTERNAL MEDICINE

## 2025-01-27 PROCEDURE — 3078F DIAST BP <80 MM HG: CPT | Performed by: INTERNAL MEDICINE

## 2025-01-27 PROCEDURE — 99213 OFFICE O/P EST LOW 20 MIN: CPT | Performed by: INTERNAL MEDICINE

## 2025-01-27 ASSESSMENT — ENCOUNTER SYMPTOMS
NERVOUS/ANXIOUS: 0
NECK PAIN: 1
SINUS PAIN: 0
SORE THROAT: 0
ABDOMINAL DISTENTION: 0
UNEXPECTED WEIGHT CHANGE: 0
FATIGUE: 1
SHORTNESS OF BREATH: 0
DEPRESSION: 0
DYSURIA: 0
LOSS OF SENSATION IN FEET: 0
WEAKNESS: 0
BLOOD IN STOOL: 0
NUMBNESS: 1
CONFUSION: 0
NECK STIFFNESS: 1
CONSTIPATION: 1
OCCASIONAL FEELINGS OF UNSTEADINESS: 0
COUGH: 0
BACK PAIN: 1
WHEEZING: 0
ADENOPATHY: 0
DIZZINESS: 0
DIARRHEA: 0
PALPITATIONS: 0

## 2025-01-27 ASSESSMENT — COLUMBIA-SUICIDE SEVERITY RATING SCALE - C-SSRS
1. IN THE PAST MONTH, HAVE YOU WISHED YOU WERE DEAD OR WISHED YOU COULD GO TO SLEEP AND NOT WAKE UP?: NO
2. HAVE YOU ACTUALLY HAD ANY THOUGHTS OF KILLING YOURSELF?: NO
6. HAVE YOU EVER DONE ANYTHING, STARTED TO DO ANYTHING, OR PREPARED TO DO ANYTHING TO END YOUR LIFE?: NO

## 2025-01-27 ASSESSMENT — PAIN SCALES - GENERAL: PAINLEVEL_OUTOF10: 7

## 2025-01-27 NOTE — PROGRESS NOTES
Subjective   Patient ID: Soni Ochoa is a 64 y.o. female who presents for 3 month f/u (Pt would like to talk about medication for her dental procedure to help her relax).      HPI     H/O Insomnia- stated that trazodone did not help. She was on clonazepam for many years which helped her with sleep and anxiety. Taking Clonazepam, at night helps. Stated that she stopped Arimidex, and her oncologist is aware. Complaining of abdominal bloating since last few years. Has seen Gastroenterologist and work up was negative. History of chronic constipation-. Taking Colace, senna and MiraLax with no relief .    H/O Chronic low back pain- seeing Dr Santiago. H/O RA- Was started on Enbrel, she was on Humira before.     H/O Osteoporosis- Last bone density scan was in 2020, stated Prolia caused stomach upset.     History of vitamin-D deficiency-unable to tolerate vitamin-D tablets, advised to use droplets over-the-counter.     Concerned about weight gain, wants to lose weight. Her weight loss goal is 40 pounds. Stated she lost weight with weight watchers previously. Her activity is limited to back pain.     Discussed weight loss medication options, due to constipation,  In the interim started on metformin,  she stopped it after few weeks as it did not help with weight loss    Seeing Dr. Hughes, possible Sjogren's??       Review of Systems   Constitutional:  Positive for fatigue. Negative for unexpected weight change.   HENT:  Negative for congestion, ear pain, sinus pain and sore throat.    Respiratory:  Negative for cough, shortness of breath and wheezing.    Cardiovascular:  Negative for chest pain, palpitations and leg swelling.   Gastrointestinal:  Positive for constipation. Negative for abdominal distention, blood in stool and diarrhea.   Genitourinary:  Negative for dysuria, pelvic pain and vaginal bleeding.   Musculoskeletal:  Positive for back pain, neck pain and neck stiffness.   Neurological:  Positive for  "numbness. Negative for dizziness, syncope and weakness.   Hematological:  Negative for adenopathy.   Psychiatric/Behavioral:  Negative for behavioral problems, confusion and suicidal ideas. The patient is not nervous/anxious.        Objective   /70 (BP Location: Left arm, Patient Position: Sitting, BP Cuff Size: Large adult)   Pulse 71   Temp 36.5 °C (97.7 °F) (Temporal)   Ht 1.651 m (5' 5\")   Wt 79.8 kg (176 lb)   SpO2 95%   BMI 29.29 kg/m²     Physical Exam  Constitutional:       General: She is not in acute distress.  HENT:      Head: Normocephalic and atraumatic.   Eyes:      Extraocular Movements: Extraocular movements intact.      Pupils: Pupils are equal, round, and reactive to light.   Cardiovascular:      Rate and Rhythm: Normal rate and regular rhythm.      Heart sounds: No murmur heard.  Pulmonary:      Effort: Pulmonary effort is normal.      Breath sounds: Normal breath sounds. No wheezing or rales.   Abdominal:      General: Bowel sounds are normal.      Palpations: Abdomen is soft. There is no mass.      Tenderness: There is no guarding.      Hernia: No hernia is present.   Musculoskeletal:      Right lower leg: No edema.      Left lower leg: No edema.   Lymphadenopathy:      Cervical: No cervical adenopathy.   Skin:     Comments: Ganglion cyst on right wrist   Neurological:      General: No focal deficit present.      Mental Status: She is alert.      Cranial Nerves: No cranial nerve deficit.   Psychiatric:         Mood and Affect: Mood normal.         Assessment/Plan       Soni was seen today for 3 month f/u.  Diagnoses and all orders for this visit:  Post-menopausal osteoporosis (Primary)  -     XR DEXA bone density; Future  Anxiety disorder, unspecified  Essential hypertension  Insulin resistance      Discussed about taking clonazepam 2 mg before dental procedure if needed  Will call him before the procedure    Advised to follow-up with her gastroenterologist Dr. Winchester        Current " Outpatient Medications   Medication Instructions    betamethasone, augmented, (Diprolene, augmented,) 0.05 % ointment Topical, 2 times daily    celecoxib (CELEBREX) 200 mg, oral, 2 times daily (morning and late afternoon)    clonazePAM (KLONOPIN) 1 mg, oral, Daily PRN    docusate sodium (COLACE) 100 mg, oral, 2 times daily PRN    DULoxetine (CYMBALTA) 60 mg, oral, Daily    lactulose (Constulose) 20 gram/30 mL oral solution Take 3 TSP orally every 12 hours as needed    omeprazole (PRILOSEC) 20 mg, oral, Daily    sulfaSALAzine (AZULFIDINE) 1,000 mg, oral, 2 times daily, Do not crush, chew, or split.    tiZANidine (ZANAFLEX) 4 mg    traZODone (DESYREL) 150 mg, oral, Nightly    triamterene-hydrochlorothiazid (Maxzide-25) 37.5-25 mg tablet 1 tablet, oral, Daily before breakfast

## 2025-02-03 DIAGNOSIS — F41.9 ANXIETY DISORDER, UNSPECIFIED: ICD-10-CM

## 2025-02-03 RX ORDER — CLONAZEPAM 1 MG/1
1 TABLET ORAL DAILY PRN
Qty: 30 TABLET | Refills: 2 | Status: SHIPPED | OUTPATIENT
Start: 2025-02-03

## 2025-02-04 NOTE — TELEPHONE ENCOUNTER
Im not sure what happened here but can she get scheduled ASAP?  She just called.  Let me know so I can call her back.

## 2025-02-24 ENCOUNTER — OFFICE VISIT (OUTPATIENT)
Dept: PAIN MEDICINE | Facility: CLINIC | Age: 65
End: 2025-02-24
Payer: COMMERCIAL

## 2025-02-24 ENCOUNTER — PREP FOR PROCEDURE (OUTPATIENT)
Dept: PAIN MEDICINE | Facility: CLINIC | Age: 65
End: 2025-02-24

## 2025-02-24 VITALS
RESPIRATION RATE: 22 BRPM | OXYGEN SATURATION: 99 % | SYSTOLIC BLOOD PRESSURE: 157 MMHG | BODY MASS INDEX: 29.32 KG/M2 | WEIGHT: 176 LBS | DIASTOLIC BLOOD PRESSURE: 86 MMHG | HEART RATE: 79 BPM | HEIGHT: 65 IN

## 2025-02-24 DIAGNOSIS — M25.562 CHRONIC PAIN OF LEFT KNEE: Primary | ICD-10-CM

## 2025-02-24 DIAGNOSIS — G89.29 CHRONIC PAIN OF LEFT KNEE: ICD-10-CM

## 2025-02-24 DIAGNOSIS — M25.562 CHRONIC PAIN OF LEFT KNEE: ICD-10-CM

## 2025-02-24 DIAGNOSIS — G89.29 CHRONIC PAIN OF LEFT KNEE: Primary | ICD-10-CM

## 2025-02-24 PROCEDURE — 20610 DRAIN/INJ JOINT/BURSA W/O US: CPT | Performed by: ANESTHESIOLOGY

## 2025-02-24 PROCEDURE — 2500000004 HC RX 250 GENERAL PHARMACY W/ HCPCS (ALT 636 FOR OP/ED): Performed by: ANESTHESIOLOGY

## 2025-02-24 PROCEDURE — 96372 THER/PROPH/DIAG INJ SC/IM: CPT | Mod: 59 | Performed by: ANESTHESIOLOGY

## 2025-02-24 PROCEDURE — 4004F PT TOBACCO SCREEN RCVD TLK: CPT | Performed by: ANESTHESIOLOGY

## 2025-02-24 PROCEDURE — 3079F DIAST BP 80-89 MM HG: CPT | Performed by: ANESTHESIOLOGY

## 2025-02-24 PROCEDURE — 3008F BODY MASS INDEX DOCD: CPT | Performed by: ANESTHESIOLOGY

## 2025-02-24 PROCEDURE — 3077F SYST BP >= 140 MM HG: CPT | Performed by: ANESTHESIOLOGY

## 2025-02-24 RX ORDER — LIDOCAINE HYDROCHLORIDE 10 MG/ML
4 INJECTION, SOLUTION EPIDURAL; INFILTRATION; INTRACAUDAL; PERINEURAL ONCE
Status: COMPLETED | OUTPATIENT
Start: 2025-02-24 | End: 2025-02-24

## 2025-02-24 RX ORDER — TRIAMCINOLONE ACETONIDE 40 MG/ML
40 INJECTION, SUSPENSION INTRA-ARTICULAR; INTRAMUSCULAR ONCE
Status: COMPLETED | OUTPATIENT
Start: 2025-02-24 | End: 2025-02-24

## 2025-02-24 RX ADMIN — LIDOCAINE HYDROCHLORIDE 40 MG: 10 INJECTION, SOLUTION EPIDURAL; INFILTRATION; INTRACAUDAL; PERINEURAL at 13:46

## 2025-02-24 RX ADMIN — TRIAMCINOLONE ACETONIDE 40 MG: 40 INJECTION, SUSPENSION INTRA-ARTICULAR; INTRAMUSCULAR at 13:47

## 2025-02-24 ASSESSMENT — PAIN SCALES - GENERAL
PAINLEVEL_OUTOF10: 9
PAINLEVEL_OUTOF10: 9

## 2025-02-24 ASSESSMENT — PAIN DESCRIPTION - DESCRIPTORS: DESCRIPTORS: ACHING;SHARP

## 2025-02-24 ASSESSMENT — PAIN - FUNCTIONAL ASSESSMENT: PAIN_FUNCTIONAL_ASSESSMENT: 0-10

## 2025-02-25 ENCOUNTER — HOSPITAL ENCOUNTER (OUTPATIENT)
Dept: GASTROENTEROLOGY | Facility: HOSPITAL | Age: 65
Discharge: HOME | End: 2025-02-25
Payer: COMMERCIAL

## 2025-02-25 VITALS
TEMPERATURE: 97.5 F | DIASTOLIC BLOOD PRESSURE: 85 MMHG | HEART RATE: 97 BPM | RESPIRATION RATE: 16 BRPM | BODY MASS INDEX: 29.32 KG/M2 | SYSTOLIC BLOOD PRESSURE: 152 MMHG | WEIGHT: 176 LBS | OXYGEN SATURATION: 100 % | HEIGHT: 65 IN

## 2025-02-25 DIAGNOSIS — M54.16 LUMBAR RADICULOPATHY: ICD-10-CM

## 2025-02-25 PROCEDURE — 2500000004 HC RX 250 GENERAL PHARMACY W/ HCPCS (ALT 636 FOR OP/ED): Performed by: ANESTHESIOLOGY

## 2025-02-25 PROCEDURE — 64483 NJX AA&/STRD TFRM EPI L/S 1: CPT | Performed by: ANESTHESIOLOGY

## 2025-02-25 PROCEDURE — 64484 NJX AA&/STRD TFRM EPI L/S EA: CPT | Performed by: ANESTHESIOLOGY

## 2025-02-25 PROCEDURE — 2550000001 HC RX 255 CONTRASTS: Performed by: ANESTHESIOLOGY

## 2025-02-25 RX ORDER — TRIAMCINOLONE ACETONIDE 40 MG/ML
INJECTION, SUSPENSION INTRA-ARTICULAR; INTRAMUSCULAR AS NEEDED
Status: COMPLETED | OUTPATIENT
Start: 2025-02-25 | End: 2025-02-25

## 2025-02-25 RX ORDER — LIDOCAINE HYDROCHLORIDE 5 MG/ML
INJECTION, SOLUTION INFILTRATION; INTRAVENOUS AS NEEDED
Status: COMPLETED | OUTPATIENT
Start: 2025-02-25 | End: 2025-02-25

## 2025-02-25 RX ORDER — ACETAMINOPHEN 500 MG
1000 TABLET ORAL EVERY 6 HOURS PRN
COMMUNITY

## 2025-02-25 RX ADMIN — IOHEXOL 1 ML: 240 INJECTION, SOLUTION INTRATHECAL; INTRAVASCULAR; INTRAVENOUS; ORAL at 11:32

## 2025-02-25 RX ADMIN — TRIAMCINOLONE ACETONIDE 60 MG: 40 INJECTION, SUSPENSION INTRA-ARTICULAR; INTRAMUSCULAR at 11:32

## 2025-02-25 RX ADMIN — LIDOCAINE HYDROCHLORIDE 12 ML: 5 INJECTION, SOLUTION INFILTRATION at 11:32

## 2025-02-25 ASSESSMENT — PAIN SCALES - GENERAL
PAINLEVEL_OUTOF10: 0 - NO PAIN
PAINLEVEL_OUTOF10: 10 - WORST POSSIBLE PAIN

## 2025-02-25 ASSESSMENT — PAIN - FUNCTIONAL ASSESSMENT
PAIN_FUNCTIONAL_ASSESSMENT: 0-10
PAIN_FUNCTIONAL_ASSESSMENT: 0-10

## 2025-02-25 ASSESSMENT — PAIN DESCRIPTION - DESCRIPTORS: DESCRIPTORS: ACHING;STABBING

## 2025-02-25 ASSESSMENT — COLUMBIA-SUICIDE SEVERITY RATING SCALE - C-SSRS
2. HAVE YOU ACTUALLY HAD ANY THOUGHTS OF KILLING YOURSELF?: NO
1. IN THE PAST MONTH, HAVE YOU WISHED YOU WERE DEAD OR WISHED YOU COULD GO TO SLEEP AND NOT WAKE UP?: NO
6. HAVE YOU EVER DONE ANYTHING, STARTED TO DO ANYTHING, OR PREPARED TO DO ANYTHING TO END YOUR LIFE?: NO

## 2025-02-25 NOTE — DISCHARGE INSTRUCTIONS
You underwent a procedure today    After most procedures, it is recommended that you relax and limit your activity for the remainder of the day unless you have been told otherwise by your pain physician.  You should not drive a car, operate machinery, or make important legal decisions unless otherwise directed by your pain physician.  You may resume your normal activity, including exercise, tomorrow.      Keep a written pain diary of how much pain relief you experienced following the procedure and the length of time of pain relief you experienced pain relief. Following diagnostic injections like medial branch nerve blocks, sacroiliac joint blocks, stellate ganglion injections and other blocks, it is very important you record the specific amount of pain relief you experienced immediately after the injectionand how long it lasted. Your doctor will ask you for this information at your follow up visit.     For all injections, please keep the injection site dry and inspect the site for a couple of days. You may remove the Band-Aid the day of the injection at any time.     Some discomfort, bruising or slight swelling may occur at the injection site. This is not abnormal if it occurs.  If needed you may:    -Take over the counter medication such as Tylenol or Motrin.   -Apply an ice pack for 30 minutes, 2 to 3 times a day for the first 24 hours.     You may shower today; no soaking baths, hot tubs, whirlpools or swimming pools for two days.      If you are given steroids in your injection, it may take 3-5 days for the steroid medication to take effect. You may notice a worsening of your symptoms for 1-2 days after the injection. This is not abnormal.  You may use acetaminophen, ibuprofen, or prescription medication that your doctor may have prescribed for you if you need to do so.     A few common side effects of steroids include facial flushing, sweating, restlessness, irritability,difficulty sleeping, increase in blood  sugar, and increased blood pressure. If you have diabetes, please monitor your blood sugar at least once a day for at least 5 days. If you have poorly controlled high blood pressure, monitoryour blood pressure for at least 2 days and contact your primary care physician if these numbers are unusually high for you.      If you take aspirin or non-steroidal anti-inflammatory drugs (examples are Motrin, Advil, ibuprofen, Naprosyn, Voltaren, Relafen, etc.) you may restart these this evening.    You do not need to discontinue non-aspirin-containing pain medications prior to an injection (examples: Celebrex, tramadol, hydrocodone and acetaminophen).      If you take a blood thinning medication (Coumadin, Lovenox, Fragmin,Ticlid, Plavix, Pradaxa, etc.), please discuss this with your primary care physician/cardiologist and your pain physician. These medications MUST be discontinued before you can have an injection safely, without the risk of uncontrolled bleeding. If these medications are not discontinued for an appropriate period of time, you will not be able to receivean injection.      If you are taking Coumadin, please have your INR checked the morning of your procedure and bringthe result to your appointment unless otherwise instructed. If your INR is over 1.2, your injection may need to be rescheduled to avoid uncontrolled bleeding from the needle placement.     Call [facility] at [phone] between 8am-4pm Monday - Friday if you are experiencing the following:    If you received an epidural or spinal injection:    -Headache that doesnot go away with medicine, is worse when sitting or standing up, and is greatly relieved upon lying down.   -Severe pain worse than or different than your baseline pain.   -Chills or fever (101º F or greater).   -Drainage or signs of infection at the injection site     Go directly to the Emergency Department if you are experiencing the following and received an  epidural or spinal injection:   -Abrupt weakness or progressive weakness in your legs that starts after you leave the clinic.   -Abrupt severe or worsening numbness in your legs.   -Inability to urinate after the injection or loss of bowel or bladder control without the urge to defecate or urinate.     If you have a clinical question that cannot wait until your next appointment, please call [phone] between 8am-4pm Monday - Friday or send a [employer] message. We do our best to return all non-emergency messages within 24 hours, Monday - Friday. A nurse or physician will return your message.      If you need to cancel an appointment, please call the scheduling staff at [phone] during normal business hours or leave a message at least 24 hours in advance.     If you are going to be sedated for your next procedure, you MUST have responsible adult who can legally drive accompany you home. You cannot eat or drink for eight hours prior to the planned procedure if you are going to receive sedation. You may take your non-blood thinning medications with a small sip of water.

## 2025-02-25 NOTE — H&P
"History Of Present Illness  Soni Ochoa is a 64 y.o. female presenting with low back and left leg pain.     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 She started smoking about 50 years ago. She has a 12.5 pack-year smoking history. She has never been exposed to tobacco smoke. She has never used smokeless tobacco. She reports that she does not currently use alcohol. She reports that she does not use drugs.    Family History  Family History   Problem Relation Name Age of Onset    Irregular heart beat Mother      Coronary artery disease Father      Heart disease Father          CABG,    Heart disease Brother      Diabetes Brother      Colon cancer Paternal Grandmother          Allergies  Denosumab, Ketorolac, and Nickel    Review of Systems     Physical Exam     Last Recorded Vitals  Blood pressure (!) 154/109, pulse 97, temperature 36.4 °C (97.5 °F), temperature source Temporal, resp. rate 17, height 1.651 m (5' 5\"), weight 79.8 kg (176 lb), SpO2 99%.    Relevant Results             Assessment/Plan   Assessment & Plan  Lumbar radiculopathy      Left L4 and L5 " transforaminal epidural steroid injection       I spent 10 minutes in the professional and overall care of this patient.      Konrad Santiago MD

## 2025-03-10 ENCOUNTER — APPOINTMENT (OUTPATIENT)
Dept: PAIN MEDICINE | Facility: CLINIC | Age: 65
End: 2025-03-10
Payer: COMMERCIAL

## 2025-03-11 ENCOUNTER — APPOINTMENT (OUTPATIENT)
Dept: GASTROENTEROLOGY | Facility: HOSPITAL | Age: 65
End: 2025-03-11
Payer: COMMERCIAL

## 2025-04-03 DIAGNOSIS — M54.16 LUMBAR RADICULOPATHY: ICD-10-CM

## 2025-04-03 DIAGNOSIS — I10 ESSENTIAL HYPERTENSION: ICD-10-CM

## 2025-04-03 RX ORDER — TRIAMTERENE/HYDROCHLOROTHIAZID 37.5-25 MG
1 TABLET ORAL
Qty: 90 TABLET | Refills: 1 | Status: SHIPPED | OUTPATIENT
Start: 2025-04-03

## 2025-04-04 RX ORDER — CELECOXIB 200 MG/1
CAPSULE ORAL
Qty: 60 CAPSULE | Refills: 2 | Status: SHIPPED | OUTPATIENT
Start: 2025-04-04

## 2025-04-04 RX ORDER — DULOXETIN HYDROCHLORIDE 60 MG/1
CAPSULE, DELAYED RELEASE ORAL
Qty: 30 CAPSULE | Refills: 2 | Status: SHIPPED | OUTPATIENT
Start: 2025-04-04

## 2025-04-11 ENCOUNTER — PREP FOR PROCEDURE (OUTPATIENT)
Dept: PAIN MEDICINE | Facility: CLINIC | Age: 65
End: 2025-04-11
Payer: COMMERCIAL

## 2025-04-11 DIAGNOSIS — M54.16 LUMBAR RADICULOPATHY: Primary | ICD-10-CM

## 2025-04-28 ENCOUNTER — APPOINTMENT (OUTPATIENT)
Dept: PAIN MEDICINE | Facility: CLINIC | Age: 65
End: 2025-04-28
Payer: COMMERCIAL

## 2025-04-28 ENCOUNTER — OFFICE VISIT (OUTPATIENT)
Dept: PRIMARY CARE | Facility: CLINIC | Age: 65
End: 2025-04-28
Payer: COMMERCIAL

## 2025-04-28 ENCOUNTER — APPOINTMENT (OUTPATIENT)
Dept: RHEUMATOLOGY | Facility: CLINIC | Age: 65
End: 2025-04-28
Payer: COMMERCIAL

## 2025-04-28 VITALS
SYSTOLIC BLOOD PRESSURE: 142 MMHG | BODY MASS INDEX: 28.99 KG/M2 | HEIGHT: 65 IN | HEART RATE: 87 BPM | OXYGEN SATURATION: 97 % | WEIGHT: 174 LBS | DIASTOLIC BLOOD PRESSURE: 72 MMHG | TEMPERATURE: 97.5 F

## 2025-04-28 DIAGNOSIS — J30.89 ENVIRONMENTAL AND SEASONAL ALLERGIES: Primary | ICD-10-CM

## 2025-04-28 DIAGNOSIS — I10 ESSENTIAL HYPERTENSION: ICD-10-CM

## 2025-04-28 DIAGNOSIS — E55.9 VITAMIN D DEFICIENCY: ICD-10-CM

## 2025-04-28 DIAGNOSIS — E78.5 DYSLIPIDEMIA: ICD-10-CM

## 2025-04-28 DIAGNOSIS — F41.9 ANXIETY: ICD-10-CM

## 2025-04-28 PROCEDURE — 3077F SYST BP >= 140 MM HG: CPT | Performed by: INTERNAL MEDICINE

## 2025-04-28 PROCEDURE — 3078F DIAST BP <80 MM HG: CPT | Performed by: INTERNAL MEDICINE

## 2025-04-28 PROCEDURE — 99213 OFFICE O/P EST LOW 20 MIN: CPT | Performed by: INTERNAL MEDICINE

## 2025-04-28 PROCEDURE — 3008F BODY MASS INDEX DOCD: CPT | Performed by: INTERNAL MEDICINE

## 2025-04-28 ASSESSMENT — ENCOUNTER SYMPTOMS
FATIGUE: 1
NUMBNESS: 1
ABDOMINAL DISTENTION: 0
COUGH: 0
DEPRESSION: 0
SINUS PAIN: 0
DIARRHEA: 0
UNEXPECTED WEIGHT CHANGE: 0
WEAKNESS: 0
LOSS OF SENSATION IN FEET: 0
OCCASIONAL FEELINGS OF UNSTEADINESS: 0
PALPITATIONS: 0
DYSURIA: 0
NERVOUS/ANXIOUS: 0
SHORTNESS OF BREATH: 0
BACK PAIN: 1
SORE THROAT: 0
NECK STIFFNESS: 1
ADENOPATHY: 0
BLOOD IN STOOL: 0
NECK PAIN: 1
WHEEZING: 0
CONFUSION: 0
CONSTIPATION: 1
DIZZINESS: 0

## 2025-04-28 ASSESSMENT — PAIN SCALES - GENERAL: PAINLEVEL_OUTOF10: 3

## 2025-04-28 NOTE — PROGRESS NOTES
Subjective   Patient ID: Soni Ochoa is a 64 y.o. female who presents for 3 month f/u.      HPI     H/O Insomnia- stated that trazodone did not help. She was on clonazepam for many years which helped her with sleep and anxiety. Taking Clonazepam, at night helps. Stated that she stopped Arimidex, and her oncologist is aware. Complaining of abdominal bloating since last few years. Has seen Gastroenterologist and work up was negative. History of chronic constipation-. Taking Colace, senna and MiraLax with no relief .    H/O Chronic low back pain- seeing Dr Santiago. H/O RA- Was started on Enbrel, she was on Humira before.     H/O Osteoporosis- Last bone density scan was in 2020, stated Prolia caused stomach upset.     History of vitamin-D deficiency-unable to tolerate vitamin-D tablets, advised to use droplets over-the-counter.     Concerned about weight gain, wants to lose weight. Her weight loss goal is 40 pounds. Stated she lost weight with weight watchers previously. Her activity is limited to back pain.     Discussed weight loss medication options, due to constipation,  In the interim started on metformin,  she stopped it after few weeks as it did not help with weight loss    Seeing Dr. Hughes, possible Sjogren's??       Review of Systems   Constitutional:  Positive for fatigue. Negative for unexpected weight change.   HENT:  Negative for congestion, ear pain, sinus pain and sore throat.    Respiratory:  Negative for cough, shortness of breath and wheezing.    Cardiovascular:  Negative for chest pain, palpitations and leg swelling.   Gastrointestinal:  Positive for constipation. Negative for abdominal distention, blood in stool and diarrhea.   Genitourinary:  Negative for dysuria, pelvic pain and vaginal bleeding.   Musculoskeletal:  Positive for back pain, neck pain and neck stiffness.   Neurological:  Positive for numbness. Negative for dizziness, syncope and weakness.   Hematological:  Negative for  "adenopathy.   Psychiatric/Behavioral:  Negative for behavioral problems, confusion and suicidal ideas. The patient is not nervous/anxious.        Objective   /72 (BP Location: Right arm, Patient Position: Sitting, BP Cuff Size: Large adult)   Pulse 87   Temp 36.4 °C (97.5 °F) (Temporal)   Ht 1.651 m (5' 5\")   Wt 78.9 kg (174 lb)   SpO2 97%   BMI 28.96 kg/m²     Physical Exam  Constitutional:       General: She is not in acute distress.  HENT:      Head: Normocephalic and atraumatic.   Eyes:      Extraocular Movements: Extraocular movements intact.      Pupils: Pupils are equal, round, and reactive to light.   Cardiovascular:      Rate and Rhythm: Normal rate and regular rhythm.      Heart sounds: No murmur heard.  Pulmonary:      Effort: Pulmonary effort is normal.      Breath sounds: Normal breath sounds. No wheezing or rales.   Abdominal:      General: Bowel sounds are normal.      Palpations: Abdomen is soft. There is no mass.      Tenderness: There is no guarding.      Hernia: No hernia is present.   Musculoskeletal:      Right lower leg: No edema.      Left lower leg: No edema.   Lymphadenopathy:      Cervical: No cervical adenopathy.   Neurological:      General: No focal deficit present.      Mental Status: She is alert.      Cranial Nerves: No cranial nerve deficit.   Psychiatric:         Mood and Affect: Mood normal.         Assessment/Plan       Soni was seen today for 3 month f/u.  Diagnoses and all orders for this visit:  Environmental and seasonal allergies (Primary)  -     Referral to Allergy; Future  Essential hypertension  -     CBC and Auto Differential; Future  -     Comprehensive Metabolic Panel; Future  -     CBC and Auto Differential  -     Comprehensive Metabolic Panel  Vitamin D deficiency  -     Vitamin D 25-Hydroxy,Total (for eval of Vitamin D levels); Future  -     Vitamin D 25-Hydroxy,Total (for eval of Vitamin D levels)  Dyslipidemia  -     Lipid Panel; Future  -     Lipid " Panel  Anxiety  -     TSH with reflex to Free T4 if abnormal; Future  -     TSH with reflex to Free T4 if abnormal        Taking new supplements for constipation, which is helping tremendously  No new complaints today    Refused bone density scan    Current Outpatient Medications   Medication Instructions    acetaminophen (TYLENOL) 1,000 mg, Every 6 hours PRN    betamethasone, augmented, (Diprolene, augmented,) 0.05 % ointment Topical, 2 times daily    celecoxib (CeleBREX) 200 mg capsule TAKE 1 CAPSULE (200mg) BY MOUTH TWICE DAILY (morning and late afternoon)    clonazePAM (KLONOPIN) 1 mg, oral, Daily PRN    docusate sodium (COLACE) 100 mg, oral, 2 times daily PRN    DULoxetine (Cymbalta) 60 mg DR capsule TAKE 1 CAPSULE (60mg) BY MOUTH EVERY DAY    lactulose (Constulose) 20 gram/30 mL oral solution Take 3 TSP orally every 12 hours as needed    omeprazole (PRILOSEC) 20 mg, oral, Daily    sulfaSALAzine (AZULFIDINE) 1,000 mg, oral, 2 times daily, Do not crush, chew, or split.    tiZANidine (ZANAFLEX) 4 mg    traZODone (DESYREL) 150 mg, oral, Nightly    triamterene-hydrochlorothiazid (Maxzide-25) 37.5-25 mg tablet 1 tablet, oral, Daily before breakfast

## 2025-05-03 DIAGNOSIS — F41.9 ANXIETY DISORDER, UNSPECIFIED: ICD-10-CM

## 2025-05-05 RX ORDER — CLONAZEPAM 1 MG/1
1 TABLET ORAL DAILY PRN
Qty: 30 TABLET | Refills: 3 | Status: SHIPPED | OUTPATIENT
Start: 2025-05-05

## 2025-05-20 ENCOUNTER — OFFICE VISIT (OUTPATIENT)
Dept: PAIN MEDICINE | Facility: CLINIC | Age: 65
End: 2025-05-20
Payer: COMMERCIAL

## 2025-05-20 VITALS
OXYGEN SATURATION: 97 % | BODY MASS INDEX: 28.99 KG/M2 | SYSTOLIC BLOOD PRESSURE: 152 MMHG | HEIGHT: 65 IN | DIASTOLIC BLOOD PRESSURE: 81 MMHG | HEART RATE: 81 BPM | WEIGHT: 174 LBS

## 2025-05-20 DIAGNOSIS — M54.16 LUMBAR RADICULOPATHY: Primary | ICD-10-CM

## 2025-05-20 PROCEDURE — 99214 OFFICE O/P EST MOD 30 MIN: CPT | Performed by: PHYSICIAN ASSISTANT

## 2025-05-20 ASSESSMENT — PAIN SCALES - GENERAL
PAINLEVEL_OUTOF10: 10-WORST PAIN EVER
PAINLEVEL_OUTOF10: 10 - WORST POSSIBLE PAIN

## 2025-05-20 ASSESSMENT — PAIN DESCRIPTION - DESCRIPTORS: DESCRIPTORS: ACHING

## 2025-05-20 ASSESSMENT — PAIN - FUNCTIONAL ASSESSMENT: PAIN_FUNCTIONAL_ASSESSMENT: 0-10

## 2025-05-20 NOTE — PROGRESS NOTES
"Chronic Pain Clinic Follow-Up Evaluation    Date: May 20, 2025 - 3:40 PM    Chief Complaint:   Chief Complaint   Patient presents with    Back Pain       SUBJECTIVE:    Soni Ochoa is a 64 y.o. female who presents to [facility] pain management center for a follow up appointment for ***.    Patient is an established patient of Dr Santiago    Patient has hx of:     PMH also significant for:    Had a TFESI scheduled for next week and was cancelled due to insurnce   Last MRI 10/23   Had EMG nerve root lesion L5/S1   Left buttocks down left foot     5/2024, 9/2024, 11/2024 and 2/2025     Left L4, L5 TFESI     10/10  Weakness and numbness in left side, dificulty walking and stairs   Affecting daily function     HEP - daily without improvement     SLR 60 degrees   Decreased sensation lateral aspect of LLE       Since the last visit, ***     Duration of pain:      The pain is located      Pain Description      Aggravating Factors:      Alleviating Factors:       Previous pain treatments:     Physical Therapy:        Current Outpatient Medications:  Current Medications[1]    Current Anticoagulant Therapy: {NEGATIVE/POSITIVE:10237}    OARRS: {OARRS:98308}    Pain medications reviewed:  {YES/NO:63}    Past Medical History:  Medical History[2]    Past Surgical History:  Surgical History[3]    Family History:  Family History[4]    Social History:  Social History     Substance and Sexual Activity   Alcohol Use Not Currently    Comment: rarely     Tobacco Use History[5]  Social History     Substance and Sexual Activity   Drug Use Never         Review of Systems     Physical Examination:  Vitals:    05/20/25 1523   BP: 152/81   Pulse: 81   SpO2: 97%     General:in no acute distress  Skin: skin color, texture, turgor normal, no rashes or lesions  HEENT: normocephalic, atraumatic, sclera non-icteric   Resp: unlabored on room air   Musculoskeletal:  Neck: {FVPM NECK:51101::\"Supple; good ROM.\"}  Back: {FVPM " "BACK:80998::\"No pain on palpation of the lumbar spine. Full ROM without reproducible pain.  Straight leg raising test negative bilaterally.  \"}  Extremities: {EXTREMITY EXAM:3013::\"Extremities normal. No deformities, edema, or skin discoloration\"}  Neurological:  Mental Status: alert and oriented  Gait: {[facility] GAIT:69692}    New Imaging and Diagnostic Studies:  ***    ASSESSMENT:    Soni Ochoa is a 64 y.o. female with ***. Since last visit, the pain has ***.    {Assess/Plan SmartLinks (Optional):72845}    PLAN:    Diagnostics:      Physical Therapy and Rehabilitation:     Psychologically: No need for psychologic intervention from my standpoint. There are no mental health issues of which I am aware that are contributing to the patient's pain. There are no substance abuse or alcohol abuse issues of which I am aware that are contributing to the patient's pain. ***    Medication: Continue current medication regimen. See counseling note below. Prescription for *** sent to the pharmacy   Urine tox last collected -   Opioid agreement signed -   Pill count     Duration:     Intervention: None needed at this time.     7.   Follow up:    The patient continues to see benefit and improvement in their quality of life and ability to maintain ADLs.  Patient educated about the risks of taking opioids and operating a motor vehicle.  Patient reports no adverse side effects to current medication regimen.  Current regimen does allow patient to maintain ADLs.        Patient has been educated on the risks, benefits, and alternatives of controlled substances as well as the proper way to store these medications.  The patient and I discussed the nature of this medication and its side effects.  We discussed tolerance, physical dependence, psychological dependence, addiction and opioid-induced hyperalgesia.   We discussed the fact that the patient should not drive an automobile or operate heavy machinery while taking this " medication.     The above plan and management options were discussed at length with patient. Patient is in agreement with the above and verbalized understanding. It will be communicated with the referring physician via electronic record, fax, or mail.    Nikki Singletary PA-C  May 20, 2025         [1]   Current Outpatient Medications   Medication Sig Dispense Refill    acetaminophen (Tylenol) 500 mg tablet Take 2 tablets (1,000 mg) by mouth every 6 hours if needed for mild pain (1 - 3).      betamethasone, augmented, (Diprolene, augmented,) 0.05 % ointment Apply topically 2 times a day. 30 g 3    celecoxib (CeleBREX) 200 mg capsule TAKE 1 CAPSULE (200mg) BY MOUTH TWICE DAILY (morning and late afternoon) 60 capsule 2    clonazePAM (KlonoPIN) 1 mg tablet Take 1 tablet (1 mg) by mouth once daily as needed for anxiety. 30 tablet 3    docusate sodium (Colace) 100 mg capsule TAKE 1 CAPSULE BY MOUTH TWICE DAILY AS NEEDED 180 capsule 1    DULoxetine (Cymbalta) 60 mg DR capsule TAKE 1 CAPSULE (60mg) BY MOUTH EVERY DAY 30 capsule 2    lactulose (Constulose) 20 gram/30 mL oral solution Take 3 TSP orally every 12 hours as needed      omeprazole (PriLOSEC) 20 mg DR capsule TAKE 1 CAPSULE BY MOUTH EVERY DAY 90 capsule 1    tiZANidine (Zanaflex) 4 mg tablet Take 1 tablet (4 mg) by mouth.      traZODone (Desyrel) 50 mg tablet TAKE 3 TABLETS BY MOUTH NIGHTLY 270 tablet 1    triamterene-hydrochlorothiazid (Maxzide-25) 37.5-25 mg tablet TAKE 1 TABLET BY MOUTH EVERY DAY IN THE MORNING 90 tablet 1    sulfaSALAzine (Azulfidine) 500 mg DR tablet Take 2 tablets (1,000 mg) by mouth 2 times a day. Do not crush, chew, or split. 120 tablet 5     No current facility-administered medications for this visit.   [2]   Past Medical History:  Diagnosis Date    Back pain     Breast cancer     LT BREAST    DDD (degenerative disc disease), cervical     Edema of both legs 08/17/2023    Fibromyalgia, primary     Hx antineoplastic chemo     Nerve root pain      Painful diabetic neuropathy (Multi) 08/17/2023    Personal history of irradiation     RA (rheumatoid arthritis)    [3]   Past Surgical History:  Procedure Laterality Date    BI MAMMO GUIDED BREAST LEFT LOCALIZATION Left 03/01/2018    BI MAMMO GUIDED LOCALIZATION BREAST LEFT LAK SURG [facility]    BREAST LUMPECTOMY Left     CATARACT EXTRACTION  2019    MEDIPORT  03/2018    OTHER SURGICAL HISTORY  11/10/2017    Wrist Surgery Left    OTHER SURGICAL HISTORY Bilateral     Essure insert and removal - [place]    SALPINGECTOMY Bilateral 05/31/2007   [4]   Family History  Problem Relation Name Age of Onset    Irregular heart beat Mother      Coronary artery disease Father      Heart disease Father          CABG,    Heart disease Brother      Diabetes Brother      Colon cancer Paternal Grandmother     [5]   Social History  Tobacco Use   Smoking Status Every Day    Current packs/day: 0.25    Average packs/day: 0.3 packs/day for 50.4 years (12.6 ttl pk-yrs)    Types: Cigarettes    Start date: 01/1975    Passive exposure: Never   Smokeless Tobacco Never

## 2025-05-27 ENCOUNTER — APPOINTMENT (OUTPATIENT)
Dept: GASTROENTEROLOGY | Facility: HOSPITAL | Age: 65
End: 2025-05-27
Payer: COMMERCIAL

## 2025-05-30 ENCOUNTER — OFFICE VISIT (OUTPATIENT)
Dept: PAIN MEDICINE | Facility: CLINIC | Age: 65
End: 2025-05-30
Payer: COMMERCIAL

## 2025-05-30 VITALS
BODY MASS INDEX: 28.99 KG/M2 | SYSTOLIC BLOOD PRESSURE: 128 MMHG | HEIGHT: 65 IN | DIASTOLIC BLOOD PRESSURE: 72 MMHG | WEIGHT: 174 LBS | RESPIRATION RATE: 16 BRPM

## 2025-05-30 DIAGNOSIS — G89.29 CHRONIC PAIN OF LEFT KNEE: ICD-10-CM

## 2025-05-30 DIAGNOSIS — M25.562 CHRONIC PAIN OF LEFT KNEE: ICD-10-CM

## 2025-05-30 PROBLEM — R42 DIZZINESS AND GIDDINESS: Status: RESOLVED | Noted: 2023-08-17 | Resolved: 2025-05-30

## 2025-05-30 PROBLEM — M92.70: Status: ACTIVE | Noted: 2025-05-30

## 2025-05-30 PROBLEM — I25.10 CORONARY ARTERY DISEASE: Status: ACTIVE | Noted: 2025-05-30

## 2025-05-30 PROBLEM — F32.A DEPRESSION: Status: ACTIVE | Noted: 2017-12-20

## 2025-05-30 PROBLEM — R76.8 SCL-70 ANTIBODY POSITIVE: Status: ACTIVE | Noted: 2017-11-29

## 2025-05-30 PROBLEM — Z98.890 H/O LUMPECTOMY: Status: ACTIVE | Noted: 2025-05-30

## 2025-05-30 PROBLEM — M70.72 BURSITIS OF LEFT HIP: Status: ACTIVE | Noted: 2025-05-30

## 2025-05-30 PROBLEM — M70.72 ISCHIAL BURSITIS OF LEFT SIDE: Status: ACTIVE | Noted: 2017-11-21

## 2025-05-30 PROCEDURE — 2500000004 HC RX 250 GENERAL PHARMACY W/ HCPCS (ALT 636 FOR OP/ED): Mod: JW | Performed by: PHYSICIAN ASSISTANT

## 2025-05-30 PROCEDURE — 20610 DRAIN/INJ JOINT/BURSA W/O US: CPT | Performed by: PHYSICIAN ASSISTANT

## 2025-05-30 RX ORDER — LIDOCAINE HYDROCHLORIDE 10 MG/ML
4 INJECTION, SOLUTION EPIDURAL; INFILTRATION; INTRACAUDAL; PERINEURAL ONCE
Status: COMPLETED | OUTPATIENT
Start: 2025-05-30 | End: 2025-05-30

## 2025-05-30 RX ORDER — TRIAMCINOLONE ACETONIDE 40 MG/ML
40 INJECTION, SUSPENSION INTRA-ARTICULAR; INTRAMUSCULAR ONCE
Status: COMPLETED | OUTPATIENT
Start: 2025-05-30 | End: 2025-05-30

## 2025-05-30 RX ADMIN — TRIAMCINOLONE ACETONIDE 40 MG: 40 INJECTION, SUSPENSION INTRA-ARTICULAR; INTRAMUSCULAR at 10:50

## 2025-05-30 RX ADMIN — LIDOCAINE HYDROCHLORIDE 40 MG: 10 INJECTION, SOLUTION EPIDURAL; INFILTRATION; INTRACAUDAL; PERINEURAL at 10:49

## 2025-05-30 ASSESSMENT — ENCOUNTER SYMPTOMS: ARTHRALGIAS: 1

## 2025-05-30 ASSESSMENT — LIFESTYLE VARIABLES: TOTAL SCORE: 0

## 2025-05-30 ASSESSMENT — PAIN SCALES - GENERAL
PAINLEVEL_OUTOF10: 10 - WORST POSSIBLE PAIN
PAINLEVEL_OUTOF10: 10-WORST PAIN EVER

## 2025-05-30 ASSESSMENT — PAIN DESCRIPTION - DESCRIPTORS: DESCRIPTORS: ACHING

## 2025-05-30 ASSESSMENT — PAIN - FUNCTIONAL ASSESSMENT: PAIN_FUNCTIONAL_ASSESSMENT: 0-10

## 2025-05-30 NOTE — H&P (VIEW-ONLY)
Subjective   Patient ID: Soni Ochoa is a 64 y.o. female who presents for Knee Pain.  Patient is a 64-year-old female here today for her approved arthrocentesis of the left knee.  Patient denies that she has had these in the past they have been efficacious and provide durable relief.  He is complaining mostly of left pain aggravation with ambulation and positive theather signs    Knee Pain         Review of Systems   Musculoskeletal:  Positive for arthralgias.       Objective   Physical Exam  Musculoskeletal:      Left knee: Crepitus present. No effusion. Decreased range of motion. Tenderness present over the medial joint line.         Assessment/Plan   Problem List Items Addressed This Visit    None  Visit Diagnoses         Codes      Chronic pain of left knee     M25.562, G89.29    Relevant Medications    triamcinolone acetonide (Kenalog-40) injection 40 mg (Start on 5/30/2025 11:00 AM)    lidocaine PF (Xylocaine) 10 mg/mL (1 %) injection 40 mg (Start on 5/30/2025 11:00 AM)        Under sterile technique and informed consent the left knee was infiltrated with a cocktail consisting 1 cc of Kenalog and 04 cc of Xylocaine patient tolerated the procedure well 22-gauge needle was utilized hemostasis easily achieved post care instructions provided patient vies take it easy for the next 72 hours.         Nolan Veliz PA-C 05/30/25 10:47 AM

## 2025-06-17 ENCOUNTER — HOSPITAL ENCOUNTER (OUTPATIENT)
Dept: OPERATING ROOM | Facility: HOSPITAL | Age: 65
Discharge: HOME | End: 2025-06-17
Payer: COMMERCIAL

## 2025-06-17 VITALS
HEART RATE: 80 BPM | SYSTOLIC BLOOD PRESSURE: 137 MMHG | HEIGHT: 65 IN | DIASTOLIC BLOOD PRESSURE: 75 MMHG | TEMPERATURE: 96.3 F | BODY MASS INDEX: 29.16 KG/M2 | RESPIRATION RATE: 18 BRPM | WEIGHT: 175 LBS | OXYGEN SATURATION: 99 %

## 2025-06-17 DIAGNOSIS — M54.16 LUMBAR RADICULOPATHY: ICD-10-CM

## 2025-06-17 PROCEDURE — 7100000009 HC PHASE TWO TIME - INITIAL BASE CHARGE

## 2025-06-17 PROCEDURE — 64483 NJX AA&/STRD TFRM EPI L/S 1: CPT | Performed by: ANESTHESIOLOGY

## 2025-06-17 PROCEDURE — 2500000004 HC RX 250 GENERAL PHARMACY W/ HCPCS (ALT 636 FOR OP/ED): Mod: JW | Performed by: ANESTHESIOLOGY

## 2025-06-17 PROCEDURE — 3600000001 HC OR TIME - INITIAL BASE CHARGE - PROCEDURE LEVEL ONE

## 2025-06-17 PROCEDURE — 64484 NJX AA&/STRD TFRM EPI L/S EA: CPT | Performed by: ANESTHESIOLOGY

## 2025-06-17 PROCEDURE — 2550000001 HC RX 255 CONTRASTS: Performed by: ANESTHESIOLOGY

## 2025-06-17 PROCEDURE — 3600000006 HC OR TIME - EACH INCREMENTAL 1 MINUTE - PROCEDURE LEVEL ONE

## 2025-06-17 PROCEDURE — 7100000010 HC PHASE TWO TIME - EACH INCREMENTAL 1 MINUTE

## 2025-06-17 RX ORDER — TRIAMCINOLONE ACETONIDE 40 MG/ML
INJECTION, SUSPENSION INTRA-ARTICULAR; INTRAMUSCULAR AS NEEDED
Status: COMPLETED | OUTPATIENT
Start: 2025-06-17 | End: 2025-06-17

## 2025-06-17 RX ORDER — LIDOCAINE HYDROCHLORIDE 5 MG/ML
INJECTION, SOLUTION INFILTRATION; INTRAVENOUS AS NEEDED
Status: COMPLETED | OUTPATIENT
Start: 2025-06-17 | End: 2025-06-17

## 2025-06-17 RX ADMIN — LIDOCAINE HYDROCHLORIDE 12 ML: 5 INJECTION, SOLUTION INFILTRATION at 12:18

## 2025-06-17 RX ADMIN — IOHEXOL 1 ML: 240 INJECTION, SOLUTION INTRATHECAL; INTRAVASCULAR; INTRAVENOUS; ORAL at 12:18

## 2025-06-17 RX ADMIN — TRIAMCINOLONE ACETONIDE 60 MG: 40 INJECTION, SUSPENSION INTRA-ARTICULAR; INTRAMUSCULAR at 12:19

## 2025-06-17 ASSESSMENT — COLUMBIA-SUICIDE SEVERITY RATING SCALE - C-SSRS
1. IN THE PAST MONTH, HAVE YOU WISHED YOU WERE DEAD OR WISHED YOU COULD GO TO SLEEP AND NOT WAKE UP?: NO
2. HAVE YOU ACTUALLY HAD ANY THOUGHTS OF KILLING YOURSELF?: NO
6. HAVE YOU EVER DONE ANYTHING, STARTED TO DO ANYTHING, OR PREPARED TO DO ANYTHING TO END YOUR LIFE?: NO
1. IN THE PAST MONTH, HAVE YOU WISHED YOU WERE DEAD OR WISHED YOU COULD GO TO SLEEP AND NOT WAKE UP?: NO
6. HAVE YOU EVER DONE ANYTHING, STARTED TO DO ANYTHING, OR PREPARED TO DO ANYTHING TO END YOUR LIFE?: NO
2. HAVE YOU ACTUALLY HAD ANY THOUGHTS OF KILLING YOURSELF?: NO

## 2025-06-17 ASSESSMENT — PAIN SCALES - GENERAL
PAINLEVEL_OUTOF10: 10 - WORST POSSIBLE PAIN
PAINLEVEL_OUTOF10: 7

## 2025-06-17 ASSESSMENT — PAIN - FUNCTIONAL ASSESSMENT
PAIN_FUNCTIONAL_ASSESSMENT: 0-10
PAIN_FUNCTIONAL_ASSESSMENT: 0-10

## 2025-06-17 ASSESSMENT — PAIN DESCRIPTION - DESCRIPTORS: DESCRIPTORS: STABBING;SHARP;SPASM

## 2025-06-17 NOTE — POST-PROCEDURE NOTE
PATIENT RECEIVED FROM PROCEDURE ALERT AND ORIENTED. STATES PAIN 7/10. BAND-AID TO BACK CLEAN , DRY, AND INTACT.   PATIENT ABLE TO STAND AND AMBULATE. DISCHARGE INSTRUCTIONS REVIEWED WITH PATIENT.

## 2025-06-17 NOTE — DISCHARGE INSTRUCTIONS
You underwent a procedure today    After most procedures, it is recommended that you relax and limit your activity for the remainder of the day unless you have been told otherwise by your pain physician.  You should not drive a car, operate machinery, or make important legal decisions unless otherwise directed by your pain physician.  You may resume your normal activity, including exercise, tomorrow.      Keep a written pain diary of how much pain relief you experienced following the procedure and the length of time of pain relief you experienced pain relief. Following diagnostic injections like medial branch nerve blocks, sacroiliac joint blocks, stellate ganglion injections and other blocks, it is very important you record the specific amount of pain relief you experienced immediately after the injectionand how long it lasted. Your doctor will ask you for this information at your follow up visit.     For all injections, please keep the injection site dry and inspect the site for a couple of days. You may remove the Band-Aid the day of the injection at any time.     Some discomfort, bruising or slight swelling may occur at the injection site. This is not abnormal if it occurs.  If needed you may:    -Take over the counter medication such as Tylenol or Motrin.   -Apply an ice pack for 30 minutes, 2 to 3 times a day for the first 24 hours.     You may shower today; no soaking baths, hot tubs, whirlpools or swimming pools for two days.      If you are given steroids in your injection, it may take 3-5 days for the steroid medication to take effect. You may notice a worsening of your symptoms for 1-2 days after the injection. This is not abnormal.  You may use acetaminophen, ibuprofen, or prescription medication that your doctor may have prescribed for you if you need to do so.     A few common side effects of steroids include facial flushing, sweating, restlessness, irritability,difficulty sleeping, increase in blood  sugar, and increased blood pressure. If you have diabetes, please monitor your blood sugar at least once a day for at least 5 days. If you have poorly controlled high blood pressure, monitoryour blood pressure for at least 2 days and contact your primary care physician if these numbers are unusually high for you.      If you take aspirin or non-steroidal anti-inflammatory drugs (examples are Motrin, Advil, ibuprofen, Naprosyn, Voltaren, Relafen, etc.) you may restart these this evening.    You do not need to discontinue non-aspirin-containing pain medications prior to an injection (examples: Celebrex, tramadol, hydrocodone and acetaminophen).      If you take a blood thinning medication (Coumadin, Lovenox, Fragmin,Ticlid, Plavix, Pradaxa, etc.), please discuss this with your primary care physician/cardiologist and your pain physician. These medications MUST be discontinued before you can have an injection safely, without the risk of uncontrolled bleeding. If these medications are not discontinued for an appropriate period of time, you will not be able to receivean injection.      If you are taking Coumadin, please have your INR checked the morning of your procedure and bringthe result to your appointment unless otherwise instructed. If your INR is over 1.2, your injection may need to be rescheduled to avoid uncontrolled bleeding from the needle placement.     Call [facility] at [phone] between 8am-4pm Monday - Friday if you are experiencing the following:    If you received an epidural or spinal injection:    -Headache that doesnot go away with medicine, is worse when sitting or standing up, and is greatly relieved upon lying down.   -Severe pain worse than or different than your baseline pain.   -Chills or fever (101º F or greater).   -Drainage or signs of infection at the injection site     Go directly to the Emergency Department if you are experiencing the following and received an  epidural or spinal injection:   -Abrupt weakness or progressive weakness in your legs that starts after you leave the clinic.   -Abrupt severe or worsening numbness in your legs.   -Inability to urinate after the injection or loss of bowel or bladder control without the urge to defecate or urinate.     If you have a clinical question that cannot wait until your next appointment, please call [phone] between 8am-4pm Monday - Friday or send a [employer] message. We do our best to return all non-emergency messages within 24 hours, Monday - Friday. A nurse or physician will return your message.      If you need to cancel an appointment, please call the scheduling staff at [phone] during normal business hours or leave a message at least 24 hours in advance.     If you are going to be sedated for your next procedure, you MUST have responsible adult who can legally drive accompany you home. You cannot eat or drink for eight hours prior to the planned procedure if you are going to receive sedation. You may take your non-blood thinning medications with a small sip of water.

## 2025-06-30 ENCOUNTER — APPOINTMENT (OUTPATIENT)
Dept: RHEUMATOLOGY | Facility: CLINIC | Age: 65
End: 2025-06-30
Payer: COMMERCIAL

## 2025-06-30 VITALS
BODY MASS INDEX: 29.52 KG/M2 | DIASTOLIC BLOOD PRESSURE: 70 MMHG | SYSTOLIC BLOOD PRESSURE: 142 MMHG | WEIGHT: 177.2 LBS | HEART RATE: 89 BPM | HEIGHT: 65 IN

## 2025-06-30 DIAGNOSIS — M35.9 UNDIFFERENTIATED CONNECTIVE TISSUE DISEASE (MULTI): Primary | ICD-10-CM

## 2025-06-30 DIAGNOSIS — R76.8 SCL-70 ANTIBODY POSITIVE: ICD-10-CM

## 2025-06-30 PROCEDURE — 3077F SYST BP >= 140 MM HG: CPT | Performed by: INTERNAL MEDICINE

## 2025-06-30 PROCEDURE — 3008F BODY MASS INDEX DOCD: CPT | Performed by: INTERNAL MEDICINE

## 2025-06-30 PROCEDURE — 99205 OFFICE O/P NEW HI 60 MIN: CPT | Performed by: INTERNAL MEDICINE

## 2025-06-30 PROCEDURE — 99215 OFFICE O/P EST HI 40 MIN: CPT | Performed by: INTERNAL MEDICINE

## 2025-06-30 PROCEDURE — 3078F DIAST BP <80 MM HG: CPT | Performed by: INTERNAL MEDICINE

## 2025-06-30 PROCEDURE — 4004F PT TOBACCO SCREEN RCVD TLK: CPT | Performed by: INTERNAL MEDICINE

## 2025-06-30 ASSESSMENT — PATIENT HEALTH QUESTIONNAIRE - PHQ9
1. LITTLE INTEREST OR PLEASURE IN DOING THINGS: NOT AT ALL
SUM OF ALL RESPONSES TO PHQ9 QUESTIONS 1 AND 2: 0
2. FEELING DOWN, DEPRESSED OR HOPELESS: NOT AT ALL

## 2025-06-30 ASSESSMENT — PAIN SCALES - GENERAL: PAINLEVEL_OUTOF10: 5

## 2025-06-30 NOTE — PROGRESS NOTES
"Subjective   Patient ID: [id number]   Soni Ochoa is a 65 y.o. female who presents for Follow-up.    HPI  She was seeing Dr. Molina then transferred to Dr. Hughes in 09/2023  Recall:    accompanies patient The patient reportsthe onset of painful jointsin ~2015-16. The patient does not recall which joints were initially affected. The patient believesshe first consulted Dr. Giancarlo Molina in 2017 . A diagnosis apparently was not forthcoming until February 2018, about the same time a left breast cancer was diagnosed. Later, following chemoradiotherapy in 2019, the patient returned to Dr. Molina with a history of pain and swelling of the right foot and ankle. Subsequently, the patient sustained pain and swelling of the left knee followed by pain and swelling of both hands and wrists. The precise time line of these eventsis uncertain. The patient did not keep notes or a diary of the occurrence of these symptoms. The patient believesthe arthritis has gradually evolved in itsseverity and joint involvement. About 2019 or 2020, the diagnosis of\"rheumatoid arthritis\"was made. Given that diagnosis, the patient wasinitially treated with SULFASALAZINE. Due to lack of efficacy, biologic DMARDs were initiated. Since that time, the patient has been on treatment with several agents, including CELECOXIB, ENBREL, HUMIRA, ORENCIA, and ACTEMRA. The latter four agents were selfadministered; the patient has never had infusion therapy with these agents. Most recently, the patient wastreated with RINVOQ for a short interval. The patient does not know why the drug wassuddenly discontinued.   Patient reports initial diagnosis by Dr. Molina was joint pain and stiffness of knees, there was also swelling of R ankle.   Patient reports that none of the bDMARDs she had trialed helped except the rinvoq, she thinks it might have helped somewhat.     Since seieng Dr. Hughes he diagnosed her with fibromyalgia, she had a  CHELY Scl 70 but based " on his evaluations there was no evidence of Ssc, he also tried to take her off the SSZ but patient thinks its helped     Today she reports pain in knees, ankles, feet, shins all the way up to her hips and lower back. Most bothersome is lower back. When her WON wears off she can't even sit.   She reports a calcification in her L knee since she bumped her knee and it's been bothering her since 2018.   She reports that the toes get puffy   Pain is worse with ambulation and steps, gets better with rest     She is sore in her R wrist from site of ganglion cyst, had L ganglion cyst resection of her L wrist, but her L wrist is starting to get sore again  Worse with overuse too     She gets lumbar WON by Dr. Santiago, last given 2 weeks ago   Takes tizanidine as needed and is on cymbalta   Takes celebrex 200mg BID and tylenol occ.     She has been out of SSZ for a week and a half     Patient reports she has been out of SSZ for a week and a half and she reports that she feels a difference, thinks it helps her generalized joint stiffness and achiness.       ROS  Denies fever, chills, weight loss. + fatigue, unrefreshed in AM. Denies dry eyes. No ocular inflx. + dry mouth, dental loss, loss of taste, nasal or oral ulcers, difficulty swallowing. Denies chest pain. Denies shortness of breath, cough, asthma, or recurrent respiratory infections. Denies dysphagia, nausea, vomiting, abdominal pain, constipation, diarrhea, melena or hematochezia. Heartburn is controlled on prilosec. No recurrent urinary infections or STDs, no genital or anal ulcers. Denies Raynaud's phenomenon. Denies  history of clots (arterial or venous), never been pregnant.     PMH/PSH: IBS-C, GERD, LE edema, breast CA s/p lumpectomy, chemotherapy, radiation and herceptin, L ganglion cyst surgery, cataract surgery with implants   Social: Smoker 5-10 a day, rarely alcohol, no drug use. Retired, worked in and out of the medical field since 22, also exporting of  hazardous materials. No children.   FHx: OP in grandmother and mother     Problem List[1]     Medical History[2]     Surgical History[3]     Social History     Socioeconomic History    Marital status:      Spouse name: Abundio    Number of children: 0    Years of education: Not on file    Highest education level: Associate degree: occupational, technical, or vocational program   Occupational History    Not on file   Tobacco Use    Smoking status: Every Day     Current packs/day: 0.25     Average packs/day: 0.3 packs/day for 50.5 years (12.6 ttl pk-yrs)     Types: Cigarettes     Start date: 01/1975     Passive exposure: Never    Smokeless tobacco: Never   Vaping Use    Vaping status: Never Used   Substance and Sexual Activity    Alcohol use: Not Currently     Comment: rarely    Drug use: Never    Sexual activity: Not Currently     Partners: Female     Birth control/protection: Post-menopausal   Other Topics Concern    Not on file   Social History Narrative    Not on file     Social Drivers of Health     Financial Resource Strain: Not on file   Food Insecurity: Not on file   Transportation Needs: Not on file   Physical Activity: Not on file   Stress: Not on file   Social Connections: Not on file   Intimate Partner Violence: Not on file   Housing Stability: Not on file        RX Allergies[4]     Current Medications[5]       Objective     Visit Vitals  /70 (BP Location: Right arm, Patient Position: Sitting, BP Cuff Size: Adult long)   Pulse 89        Physical Exam  General: AAOx3, Cooperative  L forearm telangiectasias?  MSK: Diffuse myofascial tenderness present  Hand/Fingers: No erythema, swelling, tenderness or warmth at DIP, PIP, or MCP joints, FROM grossly. Good hand .   Wrists: No erythema, swelling, warmth or tenderness at wrist, FROM grossly  Elbows: No tenderness, swelling, erythema or warmth at elbows, FROM grossly. No nodules   Shoulders: FROM  Lower Extremities:   Knees: No tenderness,  "deformities, swelling, rashes, or warmth  Ankles: No deformities, tenderness, edema, erythema, ulceration, or warmth at the ankle  Feet: Negative MTP squeeze. Normal ROM grossly.   Spine: No spinal tenderness to palpation. No SI joint tenderness. Gaenslen test negative       Lab Results   Component Value Date    WBC 15.6 (H) 01/18/2024    HGB 12.6 01/18/2024    HCT 39.6 01/18/2024    MCV 95 01/18/2024     01/18/2024        Chemistry    Lab Results   Component Value Date/Time     03/03/2021 1124    K 3.5 03/03/2021 1124     03/03/2021 1124    CO2 25 03/03/2021 1124    BUN 12 03/03/2021 1124    CREATININE 0.7 03/03/2021 1124    Lab Results   Component Value Date/Time    CALCIUM 9.8 03/03/2021 1124    ALKPHOS 106 03/03/2021 1124    AST 14 03/03/2021 1124    ALT 11 03/03/2021 1124    BILITOT 0.2 03/03/2021 1124           Lab Results   Component Value Date    CRP <0.30 03/20/2024      Lab Results   Component Value Date    CHELY Positive (A) 01/18/2024    RF 10 01/18/2024    SEDRATE 17 03/20/2024      No results found for: \"CKTOTAL\"  Lab Results   Component Value Date    NEUTROABS 12.91 (H) 01/18/2024      No results found for: \"FERRITIN\"   Lab Results   Component Value Date    HEPAIGM  09/07/2023     NONREACTIVE  Reference range: NONREACTIVE     Biotin interference may cause falsely decreased results.   Patients taking a Biotin dose of up to 5 mg/day should   refrain from taking Biotin for 24 hours before sample   collection. Providers may contact their local laboratory   for further information.  Test performed at:  [facility] [address] [facility] [address]      HEPCAB  09/07/2023     NONREACTIVE  Reference range: NONREACTIVE     Results from patients taking biotin supplements or receiving   high-dose biotin therapy should be interpreted with caution   due to possible interference with this test. Providers may    contact their local laboratory for further information.  Test performed at:  [facility] [address] " "[address] [facility] [address]        Lab Results   Component Value Date    ALT 11 03/03/2021    AST 14 03/03/2021    ALKPHOS 106 03/03/2021    BILITOT 0.2 03/03/2021      No results found for: \"PPD\"   No results found for: \"URICACID\"   Lab Results   Component Value Date    CALCIUM 9.8 03/03/2021      Lab Results   Component Value Date    SPEP  09/07/2023     NORMAL    Test performed at:  [facility] [address] [facility] [address]        No results found for: \"ALBUR\", \"MUK24MAF\"        === 09/07/23 ===    XR KNEE 3 VIEWS BILATERAL    - Impression -  No radiographic evidence of inflammatory arthropathy of the knees.  Dictation workstation:   [id number]    Original Interpreting Physician:   SHARRI PARRISH MD  Original Transcribed by/Date: MMODAL Sep  7 2023 11:12A  Original Electronically Signed by/Date: SHARRI PARRISH MD Sep  7 2023  3:09P    Addendum Interpreting Physician:  Addendum Transcribed by/Date: NO ADDENDUM  Addendum Electronically Signed by/Date:   === 11/06/23 ===    MR LUMBAR SPINE WO CONTRAST    - Impression -  Mild hypertrophic change of facet joints from the L2 to the S1 level,  with only mild spinal canal stenosis and no significant foraminal  stenosis or nerve root impingement.    Signed by: Darrin Ruff 11/6/2023 1:26 PM  Dictation workstation:   [id number]     Assessment/Plan    Previous diagnosis of seronegative RA by first rheumatologist, then established with Dr. Hughes who was following her for Query UCTD, + CHELY and Scl 70. Has telangiectasia of L forearm. No skin thickening. Also has GERD and a diagnosis of IBS-C    Patient reports she bleieves SSZ helps, Reports only 20% improvement in polyarthralgias on SSZ, no prior relief with other bDMARDs. She has been out of SSZ and is requesting a refill.     She also has evidence of fibromyalgia and has a previous diagnosis of fibromyalgia.     + CHELY, Scl antibody   03/2024: normal ESR and CRP   01/2024: ACPA neg   09/2023- RF neg   HLA B27 neg "     - Labs today  - Refer to [facility] for evaluation for diagnosis of RA/inflammatory arthritis vs OA     RTC in 1 month for discussion of results       Dion Arriola MD  Division of Rheumatology   [facility]            [1]   Patient Active Problem List  Diagnosis    Ankle fracture    Anxiety    Arthritis    Rheumatoid arthritis    Bilateral low back pain with left-sided sciatica    Constipation by delayed colonic transit    Degeneration of intervertebral disc of lumbosacral region    Essential hypertension    GERD without esophagitis    History of left breast cancer    Insomnia    Left lumbar radiculitis    Lobular carcinoma in situ (LCIS) of left breast    Lower back pain    Malignant neoplasm of left female breast    Meralgia paresthetica of both lower extremities    Osteoporosis    Post-menopausal osteoporosis    Right ankle instability    Pain in right ankle and joints of right foot    Pain in joint involving pelvic region and thigh    Seasonal allergies    Sleep disorder    Sprain of right ankle    Tachycardia    Vitamin D deficiency    Ankylosing spondylitis of unspecified sites in spine (Multi)    Asymptomatic monoclonal gammopathy    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)    Insulin resistance    Palpitations    Fibromyalgia    Inflammatory polyarthritis (Multi)    Systemic involvement of connective tissue, unspecified    Coronary artery disease    Depression    Ganglion of left wrist    H/O lumpectomy    Bursitis of left hip    Ischial bursitis of left side    Juvenile osteochondrosis of second metatarsal    Scl-70 antibody positive   [2]   Past Medical History:  Diagnosis Date    Back pain     Breast cancer     LT BREAST    DDD (degenerative disc disease), cervical     Edema of both legs 08/17/2023    Fibromyalgia, primary     Hx antineoplastic chemo     Nerve root pain     Painful diabetic neuropathy (Multi) 08/17/2023    Personal history of irradiation     RA (rheumatoid arthritis)   "  [3]   Past Surgical History:  Procedure Laterality Date    BI MAMMO GUIDED BREAST LEFT LOCALIZATION Left 03/01/2018    BI MAMMO GUIDED LOCALIZATION BREAST LEFT [facility]AK SURG [facility]    BREAST LUMPECTOMY Left     CATARACT EXTRACTION  2019    MEDIPORT  03/2018    OTHER SURGICAL HISTORY  11/10/2017    Wrist Surgery Left    OTHER SURGICAL HISTORY Bilateral     Essure insert and removal - [place]    SALPINGECTOMY Bilateral 05/31/2007   [4]   Allergies  Allergen Reactions    Denosumab Other and Unknown     \"STOMACH ACHE\"     Ketorolac Other and Unknown     \"Stomach ache\"    Nickel Unknown and Swelling   [5]   Current Outpatient Medications:     acetaminophen (Tylenol) 500 mg tablet, Take 2 tablets (1,000 mg) by mouth every 6 hours if needed for mild pain (1 - 3)., Disp: , Rfl:     betamethasone, augmented, (Diprolene, augmented,) 0.05 % ointment, Apply topically 2 times a day., Disp: 30 g, Rfl: 3    celecoxib (CeleBREX) 200 mg capsule, TAKE 1 CAPSULE (200mg) BY MOUTH TWICE DAILY (morning and late afternoon), Disp: 60 capsule, Rfl: 2    clonazePAM (KlonoPIN) 1 mg tablet, Take 1 tablet (1 mg) by mouth once daily as needed for anxiety., Disp: 30 tablet, Rfl: 3    docusate sodium (Colace) 100 mg capsule, TAKE 1 CAPSULE BY MOUTH TWICE DAILY AS NEEDED, Disp: 180 capsule, Rfl: 1    DULoxetine (Cymbalta) 60 mg DR capsule, TAKE 1 CAPSULE (60mg) BY MOUTH EVERY DAY, Disp: 30 capsule, Rfl: 2    lactulose (Constulose) 20 gram/30 mL oral solution, Take 3 TSP orally every 12 hours as needed, Disp: , Rfl:     omeprazole (PriLOSEC) 20 mg DR capsule, TAKE 1 CAPSULE BY MOUTH EVERY DAY, Disp: 90 capsule, Rfl: 1    tiZANidine (Zanaflex) 4 mg tablet, Take 1 tablet (4 mg) by mouth., Disp: , Rfl:     traZODone (Desyrel) 50 mg tablet, TAKE 3 TABLETS BY MOUTH NIGHTLY, Disp: 270 tablet, Rfl: 1    triamterene-hydrochlorothiazid (Maxzide-25) 37.5-25 mg tablet, TAKE 1 TABLET BY MOUTH EVERY DAY IN THE MORNING, Disp: 90 tablet, Rfl: 1    sulfaSALAzine " (Azulfidine) 500 mg DR tablet, Take 2 tablets (1,000 mg) by mouth 2 times a day. Do not crush, chew, or split., Disp: 120 tablet, Rfl: 5

## 2025-07-02 DIAGNOSIS — K59.00 CONSTIPATION, UNSPECIFIED CONSTIPATION TYPE: ICD-10-CM

## 2025-07-02 DIAGNOSIS — K21.9 GASTRO-ESOPHAGEAL REFLUX DISEASE WITHOUT ESOPHAGITIS: ICD-10-CM

## 2025-07-02 DIAGNOSIS — F51.01 PRIMARY INSOMNIA: ICD-10-CM

## 2025-07-02 RX ORDER — TRAZODONE HYDROCHLORIDE 50 MG/1
150 TABLET ORAL NIGHTLY
Qty: 270 TABLET | Refills: 0 | Status: SHIPPED | OUTPATIENT
Start: 2025-07-02

## 2025-07-02 RX ORDER — DOCUSATE SODIUM 100 MG/1
100 CAPSULE, LIQUID FILLED ORAL 2 TIMES DAILY PRN
Qty: 180 CAPSULE | Refills: 0 | Status: SHIPPED | OUTPATIENT
Start: 2025-07-02 | End: 2025-12-29

## 2025-07-02 RX ORDER — OMEPRAZOLE 20 MG/1
20 CAPSULE, DELAYED RELEASE ORAL DAILY
Qty: 90 CAPSULE | Refills: 0 | Status: SHIPPED | OUTPATIENT
Start: 2025-07-02

## 2025-07-07 ENCOUNTER — APPOINTMENT (OUTPATIENT)
Dept: RHEUMATOLOGY | Facility: CLINIC | Age: 65
End: 2025-07-07
Payer: COMMERCIAL

## 2025-07-07 VITALS — DIASTOLIC BLOOD PRESSURE: 75 MMHG | SYSTOLIC BLOOD PRESSURE: 144 MMHG | HEART RATE: 78 BPM

## 2025-07-07 DIAGNOSIS — M25.541 ARTHRALGIA OF BOTH HANDS: Primary | ICD-10-CM

## 2025-07-07 DIAGNOSIS — M25.542 ARTHRALGIA OF BOTH HANDS: Primary | ICD-10-CM

## 2025-07-07 DIAGNOSIS — G56.01 CARPAL TUNNEL SYNDROME OF RIGHT WRIST: ICD-10-CM

## 2025-07-07 PROCEDURE — 76881 US COMPL JOINT R-T W/IMG: CPT | Performed by: INTERNAL MEDICINE

## 2025-07-07 PROCEDURE — 3078F DIAST BP <80 MM HG: CPT | Performed by: INTERNAL MEDICINE

## 2025-07-07 PROCEDURE — 4004F PT TOBACCO SCREEN RCVD TLK: CPT | Performed by: INTERNAL MEDICINE

## 2025-07-07 PROCEDURE — 3077F SYST BP >= 140 MM HG: CPT | Performed by: INTERNAL MEDICINE

## 2025-07-07 ASSESSMENT — PAIN SCALES - GENERAL: PAINLEVEL_OUTOF10: 5

## 2025-07-07 ASSESSMENT — PATIENT HEALTH QUESTIONNAIRE - PHQ9
1. LITTLE INTEREST OR PLEASURE IN DOING THINGS: SEVERAL DAYS
SUM OF ALL RESPONSES TO PHQ9 QUESTIONS 1 AND 2: 1
2. FEELING DOWN, DEPRESSED OR HOPELESS: NOT AT ALL

## 2025-07-07 NOTE — PROGRESS NOTES
Patient ID: Soni Ochoa is a 65 y.o. female.      Performed by: Porter Small MD  Authorized by: Porter Small MD        Comments: See [facility] note   Musculoskeletal Ultrasound    Date/Time: 7/7/2025 3:18 PM    Performed by: Porter Small MD  Authorized by: Porter Small MD    Procedure details:     Indications comment:  History of inflammatory arthritis - to evaluate for ongoing synovitis    Assess for:  Joint effusion, synovitis, bursitis, erosions and tenosynovitis  Comments:         **RA protocol 2, 3 and 5th MCP + PIP  bilaterally   Bilateral dorsal wrist views         FINDINGS:      2nd digit  MCP: small effusion in the 2nd MCP , minimal synovial proliferation, Doppler negative   PIP: No significant joint effusion, synovial thickening or hyperemia.        3rd digit  MCP: grade 1 GS synovial hypertrophy, negative doppler, no synovitis  PIP: No significant joint effusion, synovial thickening or hyperemia.    5th digit  MCP: No significant joint effusion, synovial thickening or hyperemia.  PIP: No significant joint effusion, synovial thickening or hyperemia.    Tenosynovitis: Negative.      LEFT HAND  2nd digit  MCP: No significant joint effusion, synovial thickening or hyperemia.  PIP: No significant joint effusion, synovial thickening or hyperemia.        3rd digit  MCP: Mild synovial thickening. No joint effusion or hyperemia.  PIP: No significant joint effusion, synovial thickening or hyperemia.      5th digit  MCP: No significant joint effusion, synovial thickening or hyperemia.  PIP: No significant joint effusion, synovial thickening or hyperemia.        Tenosynovitis: Negative.       Right wrist - Grade 1 synovial hypertrophy , with negative Doppler in the right radiocarpal joint with a small complex ganglion cyst  seen on long + transve over the scaphoid    Bifid median nerve with CSA of 11m2 with persistent median artery suggestive of CTS     Left wrist - no active synovitis of the  radiocarpal or intercarpal joint  An erosion over the lunate appreciated > 2 mm   Small ganlgion cyst 0.25 mm x 0.25 mmg    Impression  - Small erosion over the left lunate but  No active synovitis on hand and wrist exams  - BiFid median nerve right carpal tunnel with persistent median artery - correlate with EMG if suspicion of CTS  as cross sectional area was borderline on ultrasound 11mm2  - small ganglion cysts in the wrist as outlined above

## 2025-07-09 ENCOUNTER — APPOINTMENT (OUTPATIENT)
Facility: CLINIC | Age: 65
End: 2025-07-09
Payer: COMMERCIAL

## 2025-07-10 DIAGNOSIS — M06.4 INFLAMMATORY POLYARTHRITIS (MULTI): ICD-10-CM

## 2025-07-10 NOTE — TELEPHONE ENCOUNTER
PT CALLED TO REQUEST REFILL OF SSZ. STATES SHE HAS BEEN OFF OF IT DUE TO HAVING U/S & WOULD LIKE TO RESTART. STATES SHE NOTICES MORE PAIN WHILE OFF SSZ

## 2025-07-12 DIAGNOSIS — M54.16 LUMBAR RADICULOPATHY: ICD-10-CM

## 2025-07-14 RX ORDER — DULOXETIN HYDROCHLORIDE 60 MG/1
CAPSULE, DELAYED RELEASE ORAL
Qty: 30 CAPSULE | Refills: 2 | Status: SHIPPED | OUTPATIENT
Start: 2025-07-14

## 2025-07-14 RX ORDER — CELECOXIB 200 MG/1
CAPSULE ORAL
Qty: 60 CAPSULE | Refills: 2 | Status: SHIPPED | OUTPATIENT
Start: 2025-07-14

## 2025-07-14 NOTE — TELEPHONE ENCOUNTER
CALLED PT BACK, FEELS 50% BETTER ON SSZ. STATES SHE CAN BARELY WALK WITHOUT PAIN PILL.    Spironolactone Pregnancy And Lactation Text: This medication can cause feminization of the male fetus and should be avoided during pregnancy. The active metabolite is also found in breast milk.

## 2025-07-14 NOTE — TELEPHONE ENCOUNTER
Last OV: 5/30/25    Last Rx Sent:     Celebrex: 4/4/25 w 2 rfs    Cymbalta: 4/4/25 w 2 rfs    Next OV: 7/24/25

## 2025-07-15 RX ORDER — SULFASALAZINE 500 MG/1
1000 TABLET, DELAYED RELEASE ORAL 2 TIMES DAILY
Qty: 360 TABLET | Refills: 0 | Status: SHIPPED | OUTPATIENT
Start: 2025-07-15 | End: 2025-10-13

## 2025-07-15 NOTE — TELEPHONE ENCOUNTER
NOTIFIED PT THAT SHE NEEDED LABS DONE. PT STATES SHE HAD LABS AT [facility], IN [place]. BUT MISSING SED RATE, CRP, & QUEST SYSTEMIC 12 ANTIBODY. PT WILL GO TO [facility] TO COMPLETE.

## 2025-07-16 ENCOUNTER — APPOINTMENT (OUTPATIENT)
Dept: PAIN MEDICINE | Facility: CLINIC | Age: 65
End: 2025-07-16
Payer: COMMERCIAL

## 2025-07-18 ENCOUNTER — APPOINTMENT (OUTPATIENT)
Facility: CLINIC | Age: 65
End: 2025-07-18
Payer: COMMERCIAL

## 2025-07-22 LAB
CRP SERPL-MCNC: <3 MG/L
ERYTHROCYTE [SEDIMENTATION RATE] IN BLOOD BY WESTERGREN METHOD: 14 MM/H

## 2025-07-24 ENCOUNTER — APPOINTMENT (OUTPATIENT)
Facility: CLINIC | Age: 65
End: 2025-07-24
Payer: COMMERCIAL

## 2025-07-29 LAB
CRP SERPL-MCNC: <3 MG/L
ERYTHROCYTE [SEDIMENTATION RATE] IN BLOOD BY WESTERGREN METHOD: 14 MM/H
QUEST FLEXITEST1 RESULTS:: NORMAL

## 2025-08-06 ENCOUNTER — APPOINTMENT (OUTPATIENT)
Facility: CLINIC | Age: 65
End: 2025-08-06
Payer: COMMERCIAL

## 2025-08-11 ENCOUNTER — APPOINTMENT (OUTPATIENT)
Facility: CLINIC | Age: 65
End: 2025-08-11
Payer: COMMERCIAL

## 2025-08-12 ENCOUNTER — APPOINTMENT (OUTPATIENT)
Facility: CLINIC | Age: 65
End: 2025-08-12
Payer: COMMERCIAL

## 2025-08-12 VITALS — HEART RATE: 74 BPM | SYSTOLIC BLOOD PRESSURE: 140 MMHG | DIASTOLIC BLOOD PRESSURE: 78 MMHG

## 2025-08-12 DIAGNOSIS — G89.29 CHRONIC BILATERAL LOW BACK PAIN WITH LEFT-SIDED SCIATICA: ICD-10-CM

## 2025-08-12 DIAGNOSIS — M51.372 DEGENERATION OF INTERVERTEBRAL DISC OF LUMBOSACRAL REGION WITH DISCOGENIC BACK PAIN AND LOWER EXTREMITY PAIN: ICD-10-CM

## 2025-08-12 DIAGNOSIS — M54.16 LUMBAR RADICULOPATHY: ICD-10-CM

## 2025-08-12 DIAGNOSIS — M17.12 PRIMARY OSTEOARTHRITIS OF LEFT KNEE: Primary | ICD-10-CM

## 2025-08-12 DIAGNOSIS — M54.42 CHRONIC BILATERAL LOW BACK PAIN WITH LEFT-SIDED SCIATICA: ICD-10-CM

## 2025-08-12 PROCEDURE — 99213 OFFICE O/P EST LOW 20 MIN: CPT

## 2025-08-12 PROCEDURE — 3077F SYST BP >= 140 MM HG: CPT | Performed by: NURSE PRACTITIONER

## 2025-08-12 PROCEDURE — 99214 OFFICE O/P EST MOD 30 MIN: CPT | Performed by: NURSE PRACTITIONER

## 2025-08-12 PROCEDURE — 3078F DIAST BP <80 MM HG: CPT | Performed by: NURSE PRACTITIONER

## 2025-08-12 RX ORDER — DULOXETIN HYDROCHLORIDE 60 MG/1
60 CAPSULE, DELAYED RELEASE ORAL DAILY
Qty: 30 CAPSULE | Refills: 2 | Status: SHIPPED | OUTPATIENT
Start: 2025-08-12

## 2025-08-12 RX ORDER — CELECOXIB 200 MG/1
200 CAPSULE ORAL 2 TIMES DAILY
Qty: 60 CAPSULE | Refills: 2 | Status: SHIPPED | OUTPATIENT
Start: 2025-08-12 | End: 2025-11-10

## 2025-08-12 ASSESSMENT — PAIN - FUNCTIONAL ASSESSMENT: PAIN_FUNCTIONAL_ASSESSMENT: 0-10

## 2025-08-12 ASSESSMENT — ENCOUNTER SYMPTOMS
ABDOMINAL PAIN: 0
DYSURIA: 0
CONFUSION: 0
NEUROLOGICAL NEGATIVE: 1
CONSTITUTIONAL NEGATIVE: 1
NAUSEA: 0
VOMITING: 0
NERVOUS/ANXIOUS: 0
AGITATION: 0
BACK PAIN: 1
DIFFICULTY URINATING: 0
SLEEP DISTURBANCE: 0
ABDOMINAL DISTENTION: 0
CONSTIPATION: 0
DIARRHEA: 0
ARTHRALGIAS: 1

## 2025-08-12 ASSESSMENT — PAIN SCALES - GENERAL
PAINLEVEL_OUTOF10: 9
PAINLEVEL_OUTOF10: 9

## 2025-08-13 ENCOUNTER — APPOINTMENT (OUTPATIENT)
Dept: HEMATOLOGY/ONCOLOGY | Facility: CLINIC | Age: 65
End: 2025-08-13
Payer: COMMERCIAL

## 2025-08-20 ENCOUNTER — TELEPHONE (OUTPATIENT)
Facility: CLINIC | Age: 65
End: 2025-08-20
Payer: COMMERCIAL

## 2025-08-27 ENCOUNTER — APPOINTMENT (OUTPATIENT)
Dept: OBSTETRICS AND GYNECOLOGY | Facility: CLINIC | Age: 65
End: 2025-08-27
Payer: COMMERCIAL

## 2025-09-04 ENCOUNTER — APPOINTMENT (OUTPATIENT)
Dept: PRIMARY CARE | Facility: CLINIC | Age: 65
End: 2025-09-04
Payer: COMMERCIAL

## 2025-09-26 ENCOUNTER — APPOINTMENT (OUTPATIENT)
Dept: PRIMARY CARE | Facility: CLINIC | Age: 65
End: 2025-09-26
Payer: COMMERCIAL